# Patient Record
Sex: MALE | Race: WHITE | Employment: FULL TIME | URBAN - NONMETROPOLITAN AREA
[De-identification: names, ages, dates, MRNs, and addresses within clinical notes are randomized per-mention and may not be internally consistent; named-entity substitution may affect disease eponyms.]

---

## 2019-04-22 ENCOUNTER — OFFICE VISIT (OUTPATIENT)
Dept: INTERNAL MEDICINE CLINIC | Age: 39
End: 2019-04-22
Payer: COMMERCIAL

## 2019-04-22 VITALS
BODY MASS INDEX: 40.43 KG/M2 | SYSTOLIC BLOOD PRESSURE: 160 MMHG | WEIGHT: 315 LBS | HEART RATE: 72 BPM | DIASTOLIC BLOOD PRESSURE: 100 MMHG | TEMPERATURE: 98.4 F | OXYGEN SATURATION: 98 % | RESPIRATION RATE: 16 BRPM | HEIGHT: 74 IN

## 2019-04-22 DIAGNOSIS — N28.9 RENAL INSUFFICIENCY: ICD-10-CM

## 2019-04-22 DIAGNOSIS — D64.9 ANEMIA, UNSPECIFIED TYPE: ICD-10-CM

## 2019-04-22 DIAGNOSIS — R03.0 ELEVATED BP WITHOUT DIAGNOSIS OF HYPERTENSION: Primary | ICD-10-CM

## 2019-04-22 LAB
A/G RATIO: 1.7 (ref 1.1–2.2)
ALBUMIN SERPL-MCNC: 4.9 G/DL (ref 3.4–5)
ALP BLD-CCNC: 65 U/L (ref 40–129)
ALT SERPL-CCNC: 33 U/L (ref 10–40)
ANION GAP SERPL CALCULATED.3IONS-SCNC: 19 MMOL/L (ref 3–16)
AST SERPL-CCNC: 19 U/L (ref 15–37)
BASOPHILS ABSOLUTE: 0 K/UL (ref 0–0.2)
BASOPHILS RELATIVE PERCENT: 0.6 %
BILIRUB SERPL-MCNC: 0.8 MG/DL (ref 0–1)
BUN BLDV-MCNC: 13 MG/DL (ref 7–20)
CALCIUM SERPL-MCNC: 10.5 MG/DL (ref 8.3–10.6)
CHLORIDE BLD-SCNC: 101 MMOL/L (ref 99–110)
CO2: 21 MMOL/L (ref 21–32)
CREAT SERPL-MCNC: 0.9 MG/DL (ref 0.9–1.3)
EOSINOPHILS ABSOLUTE: 0.1 K/UL (ref 0–0.6)
EOSINOPHILS RELATIVE PERCENT: 1.2 %
GFR AFRICAN AMERICAN: >60
GFR NON-AFRICAN AMERICAN: >60
GLOBULIN: 2.9 G/DL
GLUCOSE BLD-MCNC: 96 MG/DL (ref 70–99)
HCT VFR BLD CALC: 48.3 % (ref 40.5–52.5)
HEMOGLOBIN: 16.6 G/DL (ref 13.5–17.5)
LYMPHOCYTES ABSOLUTE: 2.9 K/UL (ref 1–5.1)
LYMPHOCYTES RELATIVE PERCENT: 36.4 %
MCH RBC QN AUTO: 30.2 PG (ref 26–34)
MCHC RBC AUTO-ENTMCNC: 34.2 G/DL (ref 31–36)
MCV RBC AUTO: 88.3 FL (ref 80–100)
MONOCYTES ABSOLUTE: 0.6 K/UL (ref 0–1.3)
MONOCYTES RELATIVE PERCENT: 7.6 %
NEUTROPHILS ABSOLUTE: 4.3 K/UL (ref 1.7–7.7)
NEUTROPHILS RELATIVE PERCENT: 54.2 %
PDW BLD-RTO: 13.9 % (ref 12.4–15.4)
PLATELET # BLD: 320 K/UL (ref 135–450)
PMV BLD AUTO: 8.9 FL (ref 5–10.5)
POTASSIUM SERPL-SCNC: 4.8 MMOL/L (ref 3.5–5.1)
RBC # BLD: 5.48 M/UL (ref 4.2–5.9)
SODIUM BLD-SCNC: 141 MMOL/L (ref 136–145)
TOTAL PROTEIN: 7.8 G/DL (ref 6.4–8.2)
WBC # BLD: 7.9 K/UL (ref 4–11)

## 2019-04-22 PROCEDURE — 99203 OFFICE O/P NEW LOW 30 MIN: CPT | Performed by: INTERNAL MEDICINE

## 2019-04-22 ASSESSMENT — ENCOUNTER SYMPTOMS
RESPIRATORY NEGATIVE: 1
CHEST TIGHTNESS: 0
EYES NEGATIVE: 1
COUGH: 0
SHORTNESS OF BREATH: 0
GASTROINTESTINAL NEGATIVE: 1
WHEEZING: 0
ALLERGIC/IMMUNOLOGIC NEGATIVE: 1

## 2019-04-22 ASSESSMENT — PATIENT HEALTH QUESTIONNAIRE - PHQ9
2. FEELING DOWN, DEPRESSED OR HOPELESS: 0
SUM OF ALL RESPONSES TO PHQ QUESTIONS 1-9: 0
1. LITTLE INTEREST OR PLEASURE IN DOING THINGS: 0
SUM OF ALL RESPONSES TO PHQ9 QUESTIONS 1 & 2: 0
SUM OF ALL RESPONSES TO PHQ QUESTIONS 1-9: 0

## 2019-04-22 NOTE — PROGRESS NOTES
ororganomegaly. EXTREMITIES: - No cyanosis, clubbing, or significant edema. SKIN: Skin is warm and dry. NEUROLOGICAL: - Cranial nerves II through XII are grossly intact. IMPRESSION:    Encounter Diagnoses   Name Primary?  Elevated BP without diagnosis of hypertension Yes    Renal insufficiency     Anemia, unspecified type        ASSESSMENT/PLAN:    1. Elevated BP : follow low salt diet : recheck BP if still high will start on medications. 2. H/o renal insuff : will check BMP  3. Pt had anemia : in  : will check CBC again  4. RTO in one month  Orders Placed This Encounter   Procedures    Comprehensive Metabolic Panel    CBC Auto Differential         Mediations reviewed with the patient. Continue current medications. Appropriate prescriptions are addressed. After visit summeryprovided. Follow up as directed sooner if needed. Questions answered and patient verbalizes understanding. Call for any problems, questions, or concerns. Allergies   Allergen Reactions    Sulfa Antibiotics     Zithromax [Azithromycin] Hives     Current Outpatient Medications   Medication Sig Dispense Refill    diphenhydrAMINE-APAP, sleep, (TYLENOL PM EXTRA STRENGTH PO) Take by mouth       No current facility-administered medications for this visit.       Past Medical History:   Diagnosis Date    Acute kidney injury ([facility] [address] 2014    Appendicitis with perforation     patient had intra abdominal abscess    History of appendectomy     History of lung surgery      Past Surgical History:   Procedure Laterality Date    LAPAROSCOPIC APPENDECTOMY  13    TONSILLECTOMY       Social History     Tobacco Use    Smoking status: Former Smoker     Packs/day: 1.00     Years: 13.00     Pack years: 13.00     Types: Cigarettes     Last attempt to quit: 2014     Years since quittin.2    Smokeless tobacco: Never Used   Substance Use Topics    Alcohol use: Yes     Comment: social       LAB REVIEW:  CBC:   Lab Results   Component Value Date    WBC 11.6 01/19/2014    HGB 8.9 01/19/2014    HCT 27.6 01/19/2014     01/19/2014     Lipids: No results found for: CHOL, TRIG, HDL, LDLCALC, LDLDIRECT, TRIGLYCFAST  Renal:   Lab Results   Component Value Date    BUN 10 01/13/2014    CREATININE 0.6 01/13/2014     01/13/2014    K 4.0 01/13/2014    ALT 27 11/14/2013    AST 19 11/14/2013    GLUCOSE 117 01/13/2014     PT/INR:   Lab Results   Component Value Date    INR 1.52 01/13/2014     A1C: No results found for: Ishan Shultz MD, 4/22/2019 , 3:26 PM

## 2019-05-20 ENCOUNTER — OFFICE VISIT (OUTPATIENT)
Dept: INTERNAL MEDICINE CLINIC | Age: 39
End: 2019-05-20
Payer: COMMERCIAL

## 2019-05-20 VITALS
WEIGHT: 304 LBS | BODY MASS INDEX: 39.01 KG/M2 | OXYGEN SATURATION: 97 % | SYSTOLIC BLOOD PRESSURE: 123 MMHG | HEIGHT: 74 IN | DIASTOLIC BLOOD PRESSURE: 80 MMHG | HEART RATE: 62 BPM

## 2019-05-20 DIAGNOSIS — R03.0 ELEVATED BP WITHOUT DIAGNOSIS OF HYPERTENSION: Primary | ICD-10-CM

## 2019-05-20 DIAGNOSIS — N28.9 RENAL INSUFFICIENCY: ICD-10-CM

## 2019-05-20 PROCEDURE — 99213 OFFICE O/P EST LOW 20 MIN: CPT | Performed by: INTERNAL MEDICINE

## 2019-05-20 NOTE — PROGRESS NOTES
Nohelia Duke  Patient's  is 1980  Seen in office on 2019      SUBJECTIVE:  Pebbles Kaur rey 42 y. o.year old male presents today   Chief Complaint   Patient presents with    1 Month Follow-Up     htn, lab results     Pt is feeling well. His blood pressure is normal.  No headaches. No dizziness. No chest pain or shortness of breath. Losing weight  He was drinking lowco alcoholic drinks which had 660 elena in 24 cans < which he stopped it  His weight decreased since he stopped drinking that. Now drinking few shots  Advised patient to stop alcoholic drink  Pt is doing exercises and jogging also. All the lab results were discussed with patient in detail. Taking medications regularly. No side effects noted. Review of Systems   Constitutional: Positive for unexpected weight change. HENT: Negative. Eyes: Negative. Respiratory: Negative. Cardiovascular: Negative. Gastrointestinal: Negative. Endocrine: Negative. Genitourinary: Negative. Musculoskeletal: Negative. Skin: Negative. Allergic/Immunologic: Negative. Neurological: Negative. Hematological: Negative. Psychiatric/Behavioral: Negative. OBJECTIVE: /80 (Site: Right Upper Arm, Position: Sitting, Cuff Size: Medium Adult)   Pulse 62   Ht 6' 2\" (1.88 m)   Wt (!) 304 lb (137.9 kg)   SpO2 97%   BMI 39.03 kg/m²     Wt Readings from Last 3 Encounters:   19 (!) 304 lb (137.9 kg)   19 (!) 331 lb 9.6 oz (150.4 kg)   18 (!) 305 lb (138.3 kg)      GENERAL: - Alert, oriented, pleasant, in no apparent distress. HEENT: - Conjunctiva pink, no scleral icterus. ENT clear. NECK: -Supple. No jugular venous distention noted. No masses felt,  CARDIOVASCULAR: - Normal S1 and S2    PULMONARY: - No respiratory distress. No wheezes or rales. ABDOMEN: - Soft and non-tender,no masses  ororganomegaly. EXTREMITIES: - No cyanosis, clubbing, or significant edema. SKIN: Skin is warm and dry. NEUROLOGICAL: - Cranial nerves II through XII are grossly intact. IMPRESSION:    Encounter Diagnoses   Name Primary?  Elevated BP without diagnosis of hypertension Yes    Renal insufficiency        ASSESSMENT/PLAN:    BP is normal now  Renal function normal  Voluntary weight loss  Continue the same  Recheck BP in 6 months. Mediations reviewed with the patient. Continue current medications. Appropriate prescriptions are addressed. After visit summeryprovided. Follow up as directed sooner if needed. Questions answered and patient verbalizes understanding. Call for any problems, questions, or concerns. Allergies   Allergen Reactions    Sulfa Antibiotics     Zithromax [Azithromycin] Hives     Current Outpatient Medications   Medication Sig Dispense Refill    diphenhydrAMINE-APAP, sleep, (TYLENOL PM EXTRA STRENGTH PO) Take by mouth       No current facility-administered medications for this visit.       Past Medical History:   Diagnosis Date    Acute kidney injury ([facility] [address] 2014    Appendicitis with perforation     patient had intra abdominal abscess    History of appendectomy     History of lung surgery      Past Surgical History:   Procedure Laterality Date    LAPAROSCOPIC APPENDECTOMY  13    TONSILLECTOMY       Social History     Tobacco Use    Smoking status: Former Smoker     Packs/day: 1.00     Years: 13.00     Pack years: 13.00     Types: Cigarettes     Last attempt to quit: 2014     Years since quittin.3    Smokeless tobacco: Never Used   Substance Use Topics    Alcohol use: Yes     Comment: social       LAB REVIEW:  CBC:   Lab Results   Component Value Date    WBC 7.9 2019    HGB 16.6 2019    HCT 48.3 2019     2019     Lipids: No results found for: CHOL, TRIG, HDL, LDLCALC, LDLDIRECT, TRIGLYCFAST  Renal:   Lab Results   Component Value Date    BUN 13 2019    CREATININE 0.9 2019     2019    K 4.8 2019

## 2019-05-27 ASSESSMENT — ENCOUNTER SYMPTOMS
EYES NEGATIVE: 1
GASTROINTESTINAL NEGATIVE: 1
ALLERGIC/IMMUNOLOGIC NEGATIVE: 1
RESPIRATORY NEGATIVE: 1

## 2019-09-01 ENCOUNTER — HOSPITAL ENCOUNTER (EMERGENCY)
Age: 39
Discharge: HOME | End: 2019-09-01
Attending: EMERGENCY MEDICINE
Payer: COMMERCIAL

## 2019-09-01 VITALS
BODY MASS INDEX: 32.08 KG/M2 | OXYGEN SATURATION: 98 % | HEART RATE: 72 BPM | SYSTOLIC BLOOD PRESSURE: 136 MMHG | WEIGHT: 250 LBS | HEIGHT: 74 IN | DIASTOLIC BLOOD PRESSURE: 91 MMHG | RESPIRATION RATE: 16 BRPM | TEMPERATURE: 98.9 F

## 2019-09-01 DIAGNOSIS — K04.7 DENTAL ABSCESS: Primary | ICD-10-CM

## 2019-09-01 PROCEDURE — 99282 EMERGENCY DEPT VISIT SF MDM: CPT

## 2019-09-01 RX ORDER — HYDROCODONE BITARTRATE AND ACETAMINOPHEN 5; 325 MG/1; MG/1
1 TABLET ORAL EVERY 6 HOURS PRN
Qty: 10 TABLET | Refills: 0 | Status: SHIPPED | OUTPATIENT
Start: 2019-09-01 | End: 2019-09-04

## 2019-09-01 RX ORDER — METHYLPREDNISOLONE 4 MG/1
TABLET ORAL
Qty: 1 KIT | Refills: 0 | Status: SHIPPED | OUTPATIENT
Start: 2019-09-01 | End: 2019-11-18 | Stop reason: ALTCHOICE

## 2019-09-01 RX ORDER — AMOXICILLIN 500 MG/1
500 CAPSULE ORAL 3 TIMES DAILY
Qty: 30 CAPSULE | Refills: 0 | Status: SHIPPED | OUTPATIENT
Start: 2019-09-01 | End: 2019-09-11

## 2019-09-01 ASSESSMENT — PAIN SCALES - GENERAL: PAINLEVEL_OUTOF10: 5

## 2019-09-01 ASSESSMENT — PAIN DESCRIPTION - PAIN TYPE: TYPE: ACUTE PAIN

## 2019-09-01 ASSESSMENT — PAIN DESCRIPTION - LOCATION: LOCATION: TEETH

## 2019-09-01 ASSESSMENT — PAIN DESCRIPTION - ORIENTATION: ORIENTATION: RIGHT;UPPER

## 2019-09-01 ASSESSMENT — PAIN DESCRIPTION - DESCRIPTORS: DESCRIPTORS: THROBBING

## 2019-09-01 NOTE — ED PROVIDER NOTES
GENERAL APPEARANCE: Awake and alert. Cooperative. No acute distress. Nontoxic in appearance  HEAD: Normocephalic. Atraumatic. EYES: Sclera anicteric. ENT: Tolerates saliva. Dentition generally in good repair. The patient has a tenderness palpation in the buccal gingiva of the #7 tooth in his right upper jaw. It is slightly loose. There is minimal caries seen on his oral exam.  NECK: Supple. Trachea midline. LUNGS: Respirations unlabored. EXTREMITIES: No acute deformities. SKIN: Warm and dry. NEUROLOGICAL: No gross facial drooping. PSYCHIATRIC: Normal mood. Labs:  No results found for this visit on 09/01/19. Radiographs (if obtained):  [] The following radiograph was interpreted by myself in the absence of a radiologist:  [x] Radiologist's Report reviewed at time of ED visit:  No orders to display       ED Course and MDM:  Patient will be treated with antibiotics and a Medrol Dosepak. He is given a prescription for 10 Norco and is instructed to follow-up with his dentist as soon as possible. He is discharged in stable condition at this time. Final Impression:  1. Dental abscess      DISPOSITION Decision To Discharge    Patient referred to: Your dentist    Schedule an appointment as soon as possible for a visit   As soon as possible    Discharge medications:  New Prescriptions    AMOXICILLIN (AMOXIL) 500 MG CAPSULE    Take 1 capsule by mouth 3 times daily for 10 days    HYDROCODONE-ACETAMINOPHEN (NORCO) 5-325 MG PER TABLET    Take 1 tablet by mouth every 6 hours as needed for Pain for up to 3 days. METHYLPREDNISOLONE (MEDROL, KAYLEEN,) 4 MG TABLET    Take by mouth.      (Please note that portions of this note may have been completed with a voice recognition program. Efforts were made to edit the dictations but occasionally words are mis-transcribed.)    Roel Ahn DO, [address] [facility]  Board certified in [address] [place] [address]  09/01/19 [id number]

## 2019-11-18 ENCOUNTER — OFFICE VISIT (OUTPATIENT)
Dept: INTERNAL MEDICINE CLINIC | Age: 39
End: 2019-11-18
Payer: COMMERCIAL

## 2019-11-18 VITALS
WEIGHT: 248 LBS | HEART RATE: 65 BPM | SYSTOLIC BLOOD PRESSURE: 130 MMHG | DIASTOLIC BLOOD PRESSURE: 84 MMHG | BODY MASS INDEX: 31.84 KG/M2 | OXYGEN SATURATION: 96 %

## 2019-11-18 DIAGNOSIS — J20.8 ACUTE BRONCHITIS DUE TO OTHER SPECIFIED ORGANISMS: ICD-10-CM

## 2019-11-18 DIAGNOSIS — Z13.1 DIABETES MELLITUS SCREENING: Primary | ICD-10-CM

## 2019-11-18 DIAGNOSIS — R63.4 WEIGHT LOSS: ICD-10-CM

## 2019-11-18 LAB — HBA1C MFR BLD: 5.1 %

## 2019-11-18 PROCEDURE — 99213 OFFICE O/P EST LOW 20 MIN: CPT | Performed by: INTERNAL MEDICINE

## 2019-11-18 PROCEDURE — 83036 HEMOGLOBIN GLYCOSYLATED A1C: CPT | Performed by: INTERNAL MEDICINE

## 2019-11-18 RX ORDER — CEFUROXIME AXETIL 250 MG/1
250 TABLET ORAL 2 TIMES DAILY
Qty: 14 TABLET | Refills: 0 | Status: SHIPPED | OUTPATIENT
Start: 2019-11-18 | End: 2019-11-25

## 2019-11-18 RX ORDER — GUAIFENESIN 400 MG/1
400 TABLET ORAL 4 TIMES DAILY PRN
COMMUNITY
End: 2021-07-07

## 2020-02-17 ENCOUNTER — OFFICE VISIT (OUTPATIENT)
Dept: INTERNAL MEDICINE CLINIC | Age: 40
End: 2020-02-17
Payer: COMMERCIAL

## 2020-02-17 VITALS
WEIGHT: 250 LBS | HEART RATE: 73 BPM | TEMPERATURE: 97.6 F | DIASTOLIC BLOOD PRESSURE: 76 MMHG | OXYGEN SATURATION: 98 % | BODY MASS INDEX: 32.08 KG/M2 | HEIGHT: 74 IN | RESPIRATION RATE: 16 BRPM | SYSTOLIC BLOOD PRESSURE: 128 MMHG

## 2020-02-17 PROCEDURE — 99213 OFFICE O/P EST LOW 20 MIN: CPT | Performed by: INTERNAL MEDICINE

## 2020-02-17 RX ORDER — AMOXICILLIN 500 MG/1
500 CAPSULE ORAL 3 TIMES DAILY
Qty: 21 CAPSULE | Refills: 0 | Status: SHIPPED | OUTPATIENT
Start: 2020-02-17 | End: 2021-06-16

## 2020-02-17 NOTE — PROGRESS NOTES
Mirtha Cutler  Patient's  is 1980  Seen in office on 2020      SUBJECTIVE:  Candis Koyanagi isa 42 y. o.year old male presents today   Chief Complaint   Patient presents with    3 Month Follow-Up    Pharyngitis     Pt is here for wt check. Patient has lost weight quite a bit on some kind of diet. He has lost wt voluntary  Now feels well. He is not losing anymore weight. Denies any abdominal pain. Appetite is fair. C/o sore throat for the last few days. Has sinus pressure. Nasal congestion. No fever or chills  No nausea vomiting diarrhea. No body ache    Complains of ED for the last 4 to 5 years. Patient states he has libido but erections are not full. He was not investigated in the past.  He is not diabetic. His hemoglobin A1c was normal    Review of Systems    OBJECTIVE: /76   Pulse 73   Temp 97.6 °F (36.4 °C)   Resp 16   Ht 6' 2\" (1.88 m)   Wt 250 lb (113.4 kg)   SpO2 98%   BMI 32.10 kg/m²     Wt Readings from Last 3 Encounters:   20 250 lb (113.4 kg)   19 248 lb (112.5 kg)   19 250 lb (113.4 kg)      GENERAL: - Alert, oriented, pleasant, in no apparent distress. HEENT: - Conjunctiva pink, no scleral icterus. ENT clear. Has sinus tenderness. Inferior turbinates swollen. Throat is slightly red but no exudate noted. NECK: -Supple. No jugular venous distention noted. No masses felt,  CARDIOVASCULAR: - Normal S1 and S2    PULMONARY: - No respiratory distress. No wheezes or rales. ABDOMEN: - Soft and non-tender,no masses  ororganomegaly. EXTREMITIES: - No cyanosis, clubbing, or significant edema. SKIN: Skin is warm and dry. NEUROLOGICAL: - Cranial nerves II through XII are grossly intact. IMPRESSION:    Encounter Diagnoses   Name Primary?  Erectile dysfunction, unspecified erectile dysfunction type Yes    Acute pharyngitis, unspecified etiology     Weight loss        ASSESSMENT/PLAN:    Pharyngitis /sinusitis : amoxil 500 mg tid  Wt stable. No more weight loss  ED. Will check testosterone level and refer patient to urologist Dr. Lorelle Goodpasture. Call back for the results of testosterone. If any symptoms patient should return to office sooner  RTO in one year. Mediations reviewed with the patient. Continue current medications. Appropriate prescriptions are addressed. After visit summeryprovided. Follow up as directed sooner if needed. Questions answered and patient verbalizes understanding. Call for any problems, questions, or concerns. Allergies   Allergen Reactions    Sulfa Antibiotics Hives    Zithromax [Azithromycin] Hives     Current Outpatient Medications   Medication Sig Dispense Refill    guaiFENesin 400 MG tablet Take 400 mg by mouth 4 times daily as needed for Cough       No current facility-administered medications for this visit. Past Medical History:   Diagnosis Date    Abscess, intra-abdominal, postoperative 2014    Acute kidney injury ([facility] [address] 2014    Appendicitis with perforation     patient had intra abdominal abscess    Appendicitis, acute 2013    History of appendectomy     History of lung surgery     Weight loss 2019    Patient states he lost weight intentionally. He went on crash diet and lost about 80 pounds. He is maintaining around 150.   Does not want any test.     Past Surgical History:   Procedure Laterality Date    APPENDECTOMY      LAPAROSCOPIC APPENDECTOMY  13    TONSILLECTOMY       Social History     Tobacco Use    Smoking status: Former Smoker     Packs/day: 1.00     Years: 13.00     Pack years: 13.00     Types: Cigarettes     Last attempt to quit: 2014     Years since quittin.0    Smokeless tobacco: Never Used   Substance Use Topics    Alcohol use: Yes     Comment: social       LAB REVIEW:  CBC:   Lab Results   Component Value Date    WBC 7.9 2019    HGB 16.6 2019    HCT 48.3 2019     2019     Lipids: No results found for: CHOL, TRIG, HDL, LDLCALC, LDLDIRECT, TRIGLYCFAST  Renal:   Lab Results   Component Value Date    BUN 13 04/22/2019    CREATININE 0.9 04/22/2019     04/22/2019    K 4.8 04/22/2019    ALT 33 04/22/2019    AST 19 04/22/2019    GLUCOSE 96 04/22/2019     PT/INR:   Lab Results   Component Value Date    INR 1.52 01/13/2014     A1C:   Lab Results   Component Value Date    LABA1C 5.1 11/18/2019           Samra Barton MD, 2/17/2020 , 5:02 PM

## 2021-02-17 ENCOUNTER — TELEPHONE (OUTPATIENT)
Dept: INTERNAL MEDICINE CLINIC | Age: 41
End: 2021-02-17

## 2021-06-16 ENCOUNTER — OFFICE VISIT (OUTPATIENT)
Dept: INTERNAL MEDICINE CLINIC | Age: 41
End: 2021-06-16
Payer: COMMERCIAL

## 2021-06-16 VITALS
WEIGHT: 250 LBS | BODY MASS INDEX: 32.08 KG/M2 | SYSTOLIC BLOOD PRESSURE: 162 MMHG | HEIGHT: 74 IN | HEART RATE: 85 BPM | OXYGEN SATURATION: 98 % | DIASTOLIC BLOOD PRESSURE: 100 MMHG

## 2021-06-16 DIAGNOSIS — N50.811 PAIN IN BOTH TESTICLES: ICD-10-CM

## 2021-06-16 DIAGNOSIS — I10 ESSENTIAL HYPERTENSION: Primary | ICD-10-CM

## 2021-06-16 DIAGNOSIS — N50.812 PAIN IN BOTH TESTICLES: ICD-10-CM

## 2021-06-16 DIAGNOSIS — F51.01 PRIMARY INSOMNIA: ICD-10-CM

## 2021-06-16 LAB
ANION GAP SERPL CALCULATED.3IONS-SCNC: 15 MMOL/L (ref 3–16)
BASOPHILS ABSOLUTE: 0 K/UL (ref 0–0.2)
BASOPHILS RELATIVE PERCENT: 0.4 %
BILIRUBIN, POC: ABNORMAL
BLOOD URINE, POC: ABNORMAL
BUN BLDV-MCNC: 13 MG/DL (ref 7–20)
CALCIUM SERPL-MCNC: 10 MG/DL (ref 8.3–10.6)
CHLORIDE BLD-SCNC: 101 MMOL/L (ref 99–110)
CHOLESTEROL, TOTAL: 185 MG/DL (ref 0–199)
CLARITY, POC: CLEAR
CO2: 25 MMOL/L (ref 21–32)
COLOR, POC: YELLOW
CREAT SERPL-MCNC: 1 MG/DL (ref 0.9–1.3)
EOSINOPHILS ABSOLUTE: 0 K/UL (ref 0–0.6)
EOSINOPHILS RELATIVE PERCENT: 0.3 %
GFR AFRICAN AMERICAN: >60
GFR NON-AFRICAN AMERICAN: >60
GLUCOSE BLD-MCNC: 93 MG/DL (ref 70–99)
GLUCOSE URINE, POC: ABNORMAL
HCT VFR BLD CALC: 46.7 % (ref 40.5–52.5)
HDLC SERPL-MCNC: 81 MG/DL (ref 40–60)
HEMOGLOBIN: 16.3 G/DL (ref 13.5–17.5)
KETONES, POC: ABNORMAL
LDL CHOLESTEROL CALCULATED: 90 MG/DL
LEUKOCYTE EST, POC: ABNORMAL
LYMPHOCYTES ABSOLUTE: 1.4 K/UL (ref 1–5.1)
LYMPHOCYTES RELATIVE PERCENT: 14.5 %
MCH RBC QN AUTO: 30.5 PG (ref 26–34)
MCHC RBC AUTO-ENTMCNC: 34.9 G/DL (ref 31–36)
MCV RBC AUTO: 87.3 FL (ref 80–100)
MONOCYTES ABSOLUTE: 0.5 K/UL (ref 0–1.3)
MONOCYTES RELATIVE PERCENT: 5.1 %
NEUTROPHILS ABSOLUTE: 7.6 K/UL (ref 1.7–7.7)
NEUTROPHILS RELATIVE PERCENT: 79.7 %
NITRITE, POC: ABNORMAL
PDW BLD-RTO: 14.2 % (ref 12.4–15.4)
PH, POC: 7
PLATELET # BLD: 322 K/UL (ref 135–450)
PMV BLD AUTO: 8.9 FL (ref 5–10.5)
POTASSIUM SERPL-SCNC: 4.1 MMOL/L (ref 3.5–5.1)
PROTEIN, POC: ABNORMAL
RBC # BLD: 5.35 M/UL (ref 4.2–5.9)
SODIUM BLD-SCNC: 141 MMOL/L (ref 136–145)
SPECIFIC GRAVITY, POC: 1.02
TRIGL SERPL-MCNC: 69 MG/DL (ref 0–150)
UROBILINOGEN, POC: 1
VLDLC SERPL CALC-MCNC: 14 MG/DL
WBC # BLD: 9.6 K/UL (ref 4–11)

## 2021-06-16 PROCEDURE — 81002 URINALYSIS NONAUTO W/O SCOPE: CPT | Performed by: NURSE PRACTITIONER

## 2021-06-16 PROCEDURE — 99204 OFFICE O/P NEW MOD 45 MIN: CPT | Performed by: NURSE PRACTITIONER

## 2021-06-16 RX ORDER — TRAZODONE HYDROCHLORIDE 50 MG/1
50 TABLET ORAL NIGHTLY
Qty: 30 TABLET | Refills: 0 | Status: SHIPPED | OUTPATIENT
Start: 2021-06-16 | End: 2021-11-15 | Stop reason: ALTCHOICE

## 2021-06-16 ASSESSMENT — PATIENT HEALTH QUESTIONNAIRE - PHQ9
SUM OF ALL RESPONSES TO PHQ QUESTIONS 1-9: 2
SUM OF ALL RESPONSES TO PHQ QUESTIONS 1-9: 2
2. FEELING DOWN, DEPRESSED OR HOPELESS: 1
1. LITTLE INTEREST OR PLEASURE IN DOING THINGS: 1
SUM OF ALL RESPONSES TO PHQ9 QUESTIONS 1 & 2: 2
SUM OF ALL RESPONSES TO PHQ QUESTIONS 1-9: 2

## 2021-06-16 NOTE — LETTER
[facility]  [address] [place] [id number]  Phone: [phone]  Fax: [phone]    CHELY Welch CNP        June 16, 2021     Patient: Daniele Bowens   YOB: 1980   Date of Visit: 6/16/2021       To Whom it May Concern:    Jackelyn Barrett was seen in my clinic for an urgent matter on 6/16/2021. He may return to work on 06/20/21. If you have any questions or concerns, please don't hesitate to call.     Sincerely,           CHELY Welch CNP

## 2021-06-16 NOTE — PROGRESS NOTES
Subjective: Leandro Christensen is a 46 y.o. male who presents for evaluation of elevated blood pressures. Age at onset of elevated blood pressure:  Last few months. Cardiac symptoms: none. Patient denies: chest pain, chest pressure/discomfort, dyspnea, exertional chest pressure/discomfort and fatigue. Cardiovascular risk factors: family history of premature cardiovascular disease and male gender. Use of agents associated with hypertension: steroids and caffine pills, creatiene supplements. History of target organ damage: none. History of taking caffiene pills to stay awake at work due to insomnia. Has went from taking 4-5 pills a day to 2. Pt also takes creatine  daily for weight traing  Protein supplement post workout. Pt workout routine everyother day with weight training. Pt also reports that his testes started feeling burn, and not right about a week ago. Past Medical History:   Diagnosis Date    Abscess, intra-abdominal, postoperative 1/13/2014    Acute kidney injury ([facility] [address] 01/2014    Appendicitis with perforation     patient had intra abdominal abscess    Appendicitis, acute 11/14/2013    History of appendectomy     History of lung surgery     Weight loss 11/18/2019    Patient states he lost weight intentionally. He went on crash diet and lost about 80 pounds. He is maintaining around 150.   Does not want any test.     Patient Active Problem List    Diagnosis Date Noted    Essential hypertension 06/16/2021    Primary insomnia 06/16/2021    Weight loss 11/18/2019    Acute bronchitis due to other specified organisms 11/18/2019     Past Surgical History:   Procedure Laterality Date    APPENDECTOMY      LAPAROSCOPIC APPENDECTOMY  11/14/13    TONSILLECTOMY  1990     Current Outpatient Medications   Medication Sig Dispense Refill    traZODone (DESYREL) 50 MG tablet Take 1 tablet by mouth nightly 30 tablet 0    guaiFENesin 400 MG tablet Take 400 mg by mouth 4 times daily as needed for Cough       No current facility-administered medications for this visit. Review of Systems  Pertinent items are noted in HPI. Objective:      BP (!) 162/100   Pulse 85   Ht 6' 2\" (1.88 m)   Wt 250 lb (113.4 kg)   SpO2 98%   BMI 32.10 kg/m²   General appearance: alert, appears stated age and cooperative  Head: Normocephalic, without obvious abnormality, atraumatic  Lungs: clear to auscultation bilaterally  Heart: regular rate and rhythm, S1, S2 normal, no murmur, click, rub or gallop    Cardiographics  ECG: not done at this time      Assessment:      Hypertension, prehypertension secondary to supplements. Evidence of target organ damage: none. Plan:      Screening labs for initial evaluation: basic metabolic panel, creatinine, lipid panel and urinalysis. Screening for causes of secondary hypertension: none indicated. Dietary sodium restriction. Follow up: 3 weeks and as needed. 1. Essential hypertension  New onset HTN. Pt went for physical for new employment and was told that he was HTN for that visit. Pt is here today for more evaluation. Pt has no history of HTN, nor did I see previous results of follow labs when reviewing this chart. Pt is going stop all supplements, increase water intake and come back in three weeks for BP recheck before medication therapy is discussed. - LIPID PANEL  - BASIC METABOLIC PANEL  - CBC Auto Differential    2. Primary insomnia  New onset. Pt is having a hard time sleeping. He has a few cocktails a night to help him fall asleep; but he is only able to sleep a few hours tonight. 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 Advised pt to not drink and take this medication. traZODone (DESYREL) 50 MG tablet                Take 1 tablet by mouth nightly, Disp-30 tablet, R-0         3. Pain in both testicles  Pt states that for the past week he states that there has been a slight pain in both his testicles. No discoloration, no edema, no masses.

## 2021-06-16 NOTE — PATIENT INSTRUCTIONS
Patient Education        Creatinine and Creatinine Clearance Tests: About These Tests  What are they? Creatinine tests measure the level of the waste product creatinine (say \"rech-CX-lt-neen\") in your blood and urine. These tests show how well your kidneys are working. When the kidneys are not working well, they can't filter creatinine from the blood. So the level of creatinine in the blood goes up. The creatinine clearance (a test that measures how well your kidneys remove creatinine) goes down. Why are these tests done? A blood creatinine level or a creatinine clearance test is done to:  · See if your kidneys are working normally or if a medicine is affecting your kidneys. · See if your kidney disease is staying the same or getting better or worse. How do you prepare for these tests? You may be asked to:  · Not do any strenuous exercise for 2 days (48 hours) before having the tests. · Not eat more than 8 ounces of meat, especially beef, or other protein for 24 hours before the blood creatinine test and during the creatinine clearance urine test.  · Drink plenty of fluids if you are asked to collect your urine for 24 hours. But don't drink coffee or tea. These are diuretics that cause your body to pass more urine than normal.  If you are asked to collect urine, your doctor will give you a large container that holds about 1 gallon. You will use the container to collect your urine for 24 hours. Tell your doctor ALL the medicines, vitamins, supplements, and herbal remedies you take. Some may increase the risk of problems during your test. Your doctor will tell you if you should stop taking any of them before the test and how soon to do it. .  How are the tests done? A health professional uses a needle to take a blood sample, usually from the arm. How to do the test  You collect your urine for a period of time, such as over 4 or 24 hours.  Your doctor will give you a large container that holds about 1 gallon. You will use the container to collect your urine. · When you first get up, you empty your bladder. But don't save this urine. Write down the time you began. · For the set period of time, collect all your urine. Each time you urinate during this time period, collect your urine in a small, clean container. Then pour the urine into the large container. Don't touch the inside of either container with your fingers. · Don't get toilet paper, pubic hair, stool (feces), menstrual blood, or anything else in the urine sample. · Keep the collected urine in the refrigerator for the collection time. · Empty your bladder for the last time at or just before the end of the collection period. Add this urine to the large container. Then write down the time. How long do the tests take? The urine test will take 24 hours. The blood test will take a few minutes. What happens after the tests? · You will probably be able to go home right away. · You can go back to your usual activities right away. Follow-up care is a key part of your treatment and safety. Be sure to make and go to all appointments, and call your doctor if you are having problems. It's also a good idea to keep a list of the medicines you take. Ask your doctor when you can expect to have your test results. Where can you learn more? Go to [email or web address] org and sign in to your [email or web address] account. Enter G802 in the [facility] box to learn more about \"Creatinine and Creatinine Clearance Tests: About These Tests. \"     If you do not have an account, please click on the \"Sign Up Now\" link. Current as of: December 17, 2020               Content Version: 12.9  © 0302-9355 [employer]. Care instructions adapted under license by [facility] ([facility]).  If you have questions about a medical condition or this instruction, always ask your healthcare professional. Jaswinder Gordillo disclaims any warranty or liability for your use of this information.

## 2021-06-17 LAB
C. TRACHOMATIS DNA ,URINE: NEGATIVE
N. GONORRHOEAE DNA, URINE: NEGATIVE
URINE CULTURE, ROUTINE: NORMAL

## 2021-07-07 ENCOUNTER — OFFICE VISIT (OUTPATIENT)
Dept: INTERNAL MEDICINE CLINIC | Age: 41
End: 2021-07-07
Payer: COMMERCIAL

## 2021-07-07 VITALS
DIASTOLIC BLOOD PRESSURE: 78 MMHG | RESPIRATION RATE: 16 BRPM | HEART RATE: 96 BPM | TEMPERATURE: 98.6 F | BODY MASS INDEX: 32.56 KG/M2 | SYSTOLIC BLOOD PRESSURE: 130 MMHG | WEIGHT: 253.6 LBS | OXYGEN SATURATION: 98 %

## 2021-07-07 DIAGNOSIS — Z01.30 BLOOD PRESSURE CHECK: ICD-10-CM

## 2021-07-07 DIAGNOSIS — R63.4 WEIGHT LOSS: ICD-10-CM

## 2021-07-07 DIAGNOSIS — F51.01 PRIMARY INSOMNIA: Primary | ICD-10-CM

## 2021-07-07 PROCEDURE — 99213 OFFICE O/P EST LOW 20 MIN: CPT | Performed by: INTERNAL MEDICINE

## 2021-07-07 ASSESSMENT — ENCOUNTER SYMPTOMS
WHEEZING: 0
SHORTNESS OF BREATH: 0
ALLERGIC/IMMUNOLOGIC NEGATIVE: 1
RESPIRATORY NEGATIVE: 1
EYES NEGATIVE: 1
GASTROINTESTINAL NEGATIVE: 1
COUGH: 0

## 2021-07-07 NOTE — PROGRESS NOTES
Isa Delgadillo  Patient's  is 1980  Seen in office on 2021      SUBJECTIVE:  Winifred Ferraro rey 46 y. o.year old male presents today   Chief Complaint   Patient presents with    Follow-up     lab review     Pt is here for f/u of elevated BP  Pt was under stress and was drinking caffeine  Feels well , now  Pt has insomnia also : took trozadone only once and did not feel well and stopped taking  Sleeping good now with out any medication   Stopped taking caffeine completely   Pain in the testicles got better. Taking medications regularly. No side effects noted. Lab results reviewed     Review of Systems   Constitutional: Negative. Negative for chills, diaphoresis, fatigue and fever. HENT: Negative. Eyes: Negative. Respiratory: Negative. Negative for cough, shortness of breath and wheezing. Cardiovascular: Negative. Negative for chest pain and leg swelling. Gastrointestinal: Negative. Endocrine: Negative. Genitourinary: Negative. Musculoskeletal: Negative. Allergic/Immunologic: Negative. Neurological: Negative. Hematological: Negative. Psychiatric/Behavioral: Negative. OBJECTIVE: /78   Pulse 96   Temp 98.6 °F (37 °C)   Resp 16   Wt 253 lb 9.6 oz (115 kg)   SpO2 98%   BMI 32.56 kg/m²     Wt Readings from Last 3 Encounters:   21 253 lb 9.6 oz (115 kg)   21 250 lb (113.4 kg)   20 250 lb (113.4 kg)        Patient was seen taking COVID-19 precautions. Face mask, gloves were used. Patient also wore facemask. GENERAL:  Alert, oriented, pleasant, in no apparent distress. HEENT:  Conjunctiva pink, no scleral icterus. ENT clear. NECK: Supple. No jugular venous distention noted. No masses felt,  CARDIOVASCULAR:  Normal S1 and S2    PULMONARY:  No respiratory distress. No wheezes or rales. ABDOMEN:  Soft and non-tender,no masses  ororganomegaly. EXTREMITIES:  No cyanosis, clubbing, or significant edema.   SKIN: Skin is warm and dry.   NEUROLOGICAL:  Cranial nerves II through XII are grossly intact. IMPRESSION:    Encounter Diagnoses   Name Primary?  Primary insomnia Yes    Blood pressure check     Weight loss        ASSESSMENT/PLAN:    Elevated BP : normal now. Follow low salt and exercise. Recheck in one year. Insomnia : not taking medication . Weight loss : stable. RTO in one year    Mediations reviewed with the patient. Continue current medications. Appropriate prescriptions are addressed. After visit summeryprovided. Follow up as directed sooner if needed. Questions answered and patient verbalizes understanding. Call for any problems, questions, or concerns. Allergies   Allergen Reactions    Sulfa Antibiotics Hives    Zithromax [Azithromycin] Hives     Current Outpatient Medications   Medication Sig Dispense Refill    traZODone (DESYREL) 50 MG tablet Take 1 tablet by mouth nightly 30 tablet 0     No current facility-administered medications for this visit. Past Medical History:   Diagnosis Date    Abscess, intra-abdominal, postoperative 2014    Acute kidney injury ([facility] [address] 2014    Appendicitis with perforation     patient had intra abdominal abscess    Appendicitis, acute 2013    History of appendectomy     History of lung surgery     Weight loss 2019    Patient states he lost weight intentionally. He went on crash diet and lost about 80 pounds. He is maintaining around 150.   Does not want any test.     Past Surgical History:   Procedure Laterality Date    APPENDECTOMY      LAPAROSCOPIC APPENDECTOMY  13    TONSILLECTOMY       Social History     Tobacco Use    Smoking status: Former Smoker     Packs/day: 1.00     Years: 13.00     Pack years: 13.00     Types: Cigarettes     Quit date: 2014     Years since quittin.4    Smokeless tobacco: Never Used   Substance Use Topics    Alcohol use: Yes     Comment: social       LAB REVIEW:  CBC:   Lab Results Component Value Date    WBC 9.6 06/16/2021    HGB 16.3 06/16/2021    HCT 46.7 06/16/2021     06/16/2021     Lipids:   Lab Results   Component Value Date    HDL 81 (H) 06/16/2021    LDLCALC 90 06/16/2021     Renal:   Lab Results   Component Value Date    BUN 13 06/16/2021    CREATININE 1.0 06/16/2021     06/16/2021    K 4.1 06/16/2021    ALT 33 04/22/2019    AST 19 04/22/2019    GLUCOSE 93 06/16/2021     PT/INR:   Lab Results   Component Value Date    INR 1.52 01/13/2014     A1C:   Lab Results   Component Value Date    LABA1C 5.1 11/18/2019           Liborio Baron MD, 7/7/2021 , 1:37 PM

## 2021-08-06 PROBLEM — Z01.30 BLOOD PRESSURE CHECK: Status: RESOLVED | Noted: 2021-07-07 | Resolved: 2021-08-06

## 2021-11-15 ENCOUNTER — OFFICE VISIT (OUTPATIENT)
Dept: INTERNAL MEDICINE CLINIC | Age: 41
End: 2021-11-15
Payer: COMMERCIAL

## 2021-11-15 VITALS
BODY MASS INDEX: 34.52 KG/M2 | SYSTOLIC BLOOD PRESSURE: 120 MMHG | WEIGHT: 269 LBS | HEIGHT: 74 IN | RESPIRATION RATE: 16 BRPM | DIASTOLIC BLOOD PRESSURE: 84 MMHG | HEART RATE: 84 BPM | OXYGEN SATURATION: 98 %

## 2021-11-15 DIAGNOSIS — S83.92XA SPRAIN OF LEFT KNEE, UNSPECIFIED LIGAMENT, INITIAL ENCOUNTER: ICD-10-CM

## 2021-11-15 PROCEDURE — 99213 OFFICE O/P EST LOW 20 MIN: CPT | Performed by: INTERNAL MEDICINE

## 2021-11-15 RX ORDER — NAPROXEN 500 MG/1
500 TABLET ORAL 2 TIMES DAILY WITH MEALS
Qty: 30 TABLET | Refills: 0 | Status: SHIPPED | OUTPATIENT
Start: 2021-11-15

## 2021-11-15 NOTE — PROGRESS NOTES
Jami Malhotra  Patient's  is 1980  Seen in office on 11/15/2021      SUBJECTIVE:  Samanta Green rey 46 y. o.year old male presents today   Chief Complaint   Patient presents with    Other     left knee pain   Complains of pain in the left knee  Twisted left knee 2 weeks ago  Has pain in the left knee lateally   Flexion of the knee stiff  Limping when he walks  No falls. Taking medications regularly. No side effects noted. Patient denies any sleeping problems now. He does not have any elevated BP. He is not on any medications. Review of Systems  Review of system normal except as above. OBJECTIVE: /84 (Site: Left Upper Arm, Position: Sitting, Cuff Size: Large Adult)   Pulse 84   Resp 16   Ht 6' 2\" (1.88 m)   Wt 269 lb (122 kg)   SpO2 98%   BMI 34.54 kg/m²     Wt Readings from Last 3 Encounters:   11/15/21 269 lb (122 kg)   21 253 lb 9.6 oz (115 kg)   21 250 lb (113.4 kg)     Patient was seen taking COVID-19 precautions. Face mask, gloves were used. Patient also wore facemask. GENERAL:  Alert, oriented, pleasant, in no apparent distress. HEENT:  Conjunctiva pink, no scleral icterus. ENT clear. NECK: Supple. No jugular venous distention noted. No masses felt,  CARDIOVASCULAR:  Normal S1 and S2    PULMONARY:  No respiratory distress. No wheezes or rales. ABDOMEN:  Soft and non-tender,no masses  ororganomegaly. EXTREMITIES:  No cyanosis, clubbing, or significant edema. SKIN: Skin is warm and dry. NEUROLOGICAL:  Cranial nerves II through XII are grossly intact. There is mild swelling of the left knee. Tenderness of the left knee and lateral knee. Flexion is stiffness. No calf tenderness. Patient is limping on the left knee. IMPRESSION:    Encounter Diagnoses   Name Primary?  Sprain of left knee, unspecified ligament, initial encounter        ASSESSMENT/PLAN:    Patient has left knee pain laterally.   Take naproxen 500 mg twice a day with food  Refer patient to orthopedic surgeon  Deferred x-rays to orthopedic surgeon    Orders Placed This Encounter   Procedures   Nicolas Paez MD, [id number] [facility], [place] [address]     Return to office if the pain is worse  Follow-up with Ortho    Mediations reviewed with the patient. Continue current medications. Appropriate prescriptions are addressed. After visit summeryprovided. Follow up as directed sooner if needed. Questions answered and patient verbalizes understanding. Call for any problems, questions, or concerns. Allergies   Allergen Reactions    Sulfa Antibiotics Hives    Zithromax [Azithromycin] Hives     No current outpatient medications on file. No current facility-administered medications for this visit. Past Medical History:   Diagnosis Date    Abscess, intra-abdominal, postoperative 2014    Acute kidney injury ([facility] [address] 2014    Appendicitis with perforation     patient had intra abdominal abscess    Appendicitis, acute 2013    History of appendectomy     History of lung surgery     Weight loss 2019    Patient states he lost weight intentionally. He went on crash diet and lost about 80 pounds. He is maintaining around 150.   Does not want any test.     Past Surgical History:   Procedure Laterality Date    APPENDECTOMY      LAPAROSCOPIC APPENDECTOMY  13    TONSILLECTOMY       Social History     Tobacco Use    Smoking status: Former Smoker     Packs/day: 1.00     Years: 13.00     Pack years: 13.00     Types: Cigarettes     Quit date: 2014     Years since quittin.8    Smokeless tobacco: Never Used   Substance Use Topics    Alcohol use: Yes     Comment: social       LAB REVIEW:  CBC:   Lab Results   Component Value Date    WBC 9.6 2021    HGB 16.3 2021    HCT 46.7 2021     2021     Lipids:   Lab Results   Component Value Date    HDL 81 (H) 2021    [address] 2021     Renal:   Lab Results Component Value Date    BUN 13 06/16/2021    CREATININE 1.0 06/16/2021     06/16/2021    K 4.1 06/16/2021    ALT 33 04/22/2019    AST 19 04/22/2019    GLUCOSE 93 06/16/2021     PT/INR:   Lab Results   Component Value Date    INR 1.52 01/13/2014     A1C:   Lab Results   Component Value Date    LABA1C 5.1 11/18/2019           Gracia Haines MD, 11/15/2021 , 3:45 PM

## 2021-11-15 NOTE — PROGRESS NOTES
Patient states he twisted left knee about two weeks ago. He twisted going down icy steps and then twisted again getting out of car. He has pain lateral side of left knee. He has popping. He has decreased ROM and a lot of pain with flexion. He doesn't notice swelling.

## 2023-03-13 ENCOUNTER — OFFICE VISIT (OUTPATIENT)
Dept: INTERNAL MEDICINE CLINIC | Age: 43
End: 2023-03-13
Payer: COMMERCIAL

## 2023-03-13 VITALS
OXYGEN SATURATION: 97 % | HEIGHT: 74 IN | RESPIRATION RATE: 16 BRPM | TEMPERATURE: 97 F | SYSTOLIC BLOOD PRESSURE: 156 MMHG | HEART RATE: 80 BPM | BODY MASS INDEX: 37.4 KG/M2 | WEIGHT: 291.4 LBS | DIASTOLIC BLOOD PRESSURE: 110 MMHG

## 2023-03-13 DIAGNOSIS — S83.422A SPRAIN OF LATERAL COLLATERAL LIGAMENT OF LEFT KNEE, INITIAL ENCOUNTER: Primary | ICD-10-CM

## 2023-03-13 DIAGNOSIS — I10 ESSENTIAL HYPERTENSION: ICD-10-CM

## 2023-03-13 PROBLEM — F51.01 PRIMARY INSOMNIA: Status: RESOLVED | Noted: 2021-06-16 | Resolved: 2023-03-13

## 2023-03-13 PROBLEM — J20.8 ACUTE BRONCHITIS DUE TO OTHER SPECIFIED ORGANISMS: Status: RESOLVED | Noted: 2019-11-18 | Resolved: 2023-03-13

## 2023-03-13 PROCEDURE — 3074F SYST BP LT 130 MM HG: CPT | Performed by: INTERNAL MEDICINE

## 2023-03-13 PROCEDURE — G8417 CALC BMI ABV UP PARAM F/U: HCPCS | Performed by: INTERNAL MEDICINE

## 2023-03-13 PROCEDURE — G8484 FLU IMMUNIZE NO ADMIN: HCPCS | Performed by: INTERNAL MEDICINE

## 2023-03-13 PROCEDURE — G8427 DOCREV CUR MEDS BY ELIG CLIN: HCPCS | Performed by: INTERNAL MEDICINE

## 2023-03-13 PROCEDURE — 1036F TOBACCO NON-USER: CPT | Performed by: INTERNAL MEDICINE

## 2023-03-13 PROCEDURE — 3078F DIAST BP <80 MM HG: CPT | Performed by: INTERNAL MEDICINE

## 2023-03-13 PROCEDURE — 99213 OFFICE O/P EST LOW 20 MIN: CPT | Performed by: INTERNAL MEDICINE

## 2023-03-13 RX ORDER — NAPROXEN 500 MG/1
500 TABLET ORAL 2 TIMES DAILY WITH MEALS
Qty: 60 TABLET | Refills: 0 | Status: SHIPPED | OUTPATIENT
Start: 2023-03-13

## 2023-03-13 RX ORDER — AMLODIPINE BESYLATE 5 MG/1
5 TABLET ORAL DAILY
Qty: 30 TABLET | Refills: 0 | Status: SHIPPED | OUTPATIENT
Start: 2023-03-13

## 2023-03-13 ASSESSMENT — PATIENT HEALTH QUESTIONNAIRE - PHQ9
1. LITTLE INTEREST OR PLEASURE IN DOING THINGS: 0
SUM OF ALL RESPONSES TO PHQ9 QUESTIONS 1 & 2: 0
2. FEELING DOWN, DEPRESSED OR HOPELESS: 0
SUM OF ALL RESPONSES TO PHQ QUESTIONS 1-9: 0

## 2023-03-13 NOTE — LETTER
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 If you have any questions or concerns, please don't hesitate to call.     Sincerely,        Citlalli Oates MD

## 2023-03-13 NOTE — PROGRESS NOTES
Patient Alexia Michael  Patient's  is 1980  Seen in office on 3/13/2023      SUBJECTIVE:  [facility] rey 38 y. o.year old male presents today   Chief Complaint   Patient presents with    Knee Pain     For over a year, left knee constant dull ache, pain can get to a ten at times     C/o pain in the left knee since 2021  Slipped on icy steps in 2021 and twisted the knee . 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 No other complaints. No chest pain no shortness of breath no cough or sputum production. No abdominal pain. No nausea vomiting or diarrhea    Review of Systems  Review of system normal except as in HPI  OBJECTIVE: BP (!) 156/110 (Site: Right Upper Arm, Position: Sitting, Cuff Size: Large Adult)   Pulse 80   Temp 97 °F (36.1 °C) (Temporal)   Resp 16   Ht 6' 2\" (1.88 m)   Wt 291 lb 6.4 oz (132.2 kg)   SpO2 97%   BMI 37.41 kg/m²     Wt Readings from Last 3 Encounters:   23 291 lb 6.4 oz (132.2 kg)   11/15/21 269 lb (122 kg)   21 253 lb 9.6 oz (115 kg)      GENERAL: - Alert, oriented, pleasant, in no apparent distress. HEENT: - Conjunctiva pink, no scleral icterus. ENT clear. NECK: -Supple. No jugular venous distention noted. No masses felt,  CARDIOVASCULAR: - Normal S1 and S2    PULMONARY: - No respiratory distress. No wheezes or rales. ABDOMEN: - Soft and non-tender,no masses  ororganomegaly. EXTREMITIES: - No cyanosis, clubbing, or significant edema. SKIN: Skin is warm and dry. NEUROLOGICAL: - Cranial nerves II through XII are grossly intact. Tenderness of left knee laterally . No swelling . IMPRESSION:    Encounter Diagnoses   Name Primary?     Sprain of lateral collateral ligament of left knee, initial encounter Yes    Essential hypertension        ASSESSMENT/PLAN:    Knee brace Naprosyn and tylenol  Refer to ortho Dr. Shy Ceballos  Wants to excuse from work   Reduce work load   Patient has hypertension. We will start patient on amlodipine 5 mg daily. Patient to follow low salt diet and exercise    Mediations reviewed with the patient. Continue current medications. Appropriate prescriptions are addressed. After visit summeryprovided. Follow up as directed sooner if needed. Questions answered and patient verbalizes understanding. Call for any problems, questions, or concerns. Allergies   Allergen Reactions    Sulfa Antibiotics Hives    Zithromax [Azithromycin] Hives     No current outpatient medications on file. No current facility-administered medications for this visit. Past Medical History:   Diagnosis Date    Abscess, intra-abdominal, postoperative 2014    Acute kidney injury ([facility] [address] 2014    Appendicitis with perforation     patient had intra abdominal abscess    Appendicitis, acute 2013    History of appendectomy     History of lung surgery     Weight loss 2019    Patient states he lost weight intentionally. He went on crash diet and lost about 80 pounds. He is maintaining around 150.   Does not want any test.     Past Surgical History:   Procedure Laterality Date    APPENDECTOMY      LAPAROSCOPIC APPENDECTOMY  13    TONSILLECTOMY       Social History     Tobacco Use    Smoking status: Former     Packs/day: 1.00     Years: 13.00     Pack years: 13.00     Types: Cigarettes     Quit date: 2014     Years since quittin.1    Smokeless tobacco: Never   Substance Use Topics    Alcohol use: Yes     Comment: social       LAB REVIEW:  CBC:   Lab Results   Component Value Date/Time    WBC 9.6 2021 09:55 AM    HGB 16.3 2021 09:55 AM    HCT 46.7 2021 09:55 AM     2021 09:55 AM     Lipids:   Lab Results   Component Value Date    HDL 81 (H) 2021    LDLCALC 90 2021     Renal:   Lab Results   Component Value Date/Time    BUN 13 06/16/2021 09:55 AM    CREATININE 1.0 06/16/2021 09:55 AM     06/16/2021 09:55 AM    K 4.1 06/16/2021 09:55 AM    ALT 33 04/22/2019 03:46 PM    AST 19 04/22/2019 03:46 PM    GLUCOSE 93 06/16/2021 09:55 AM     PT/INR:   Lab Results   Component Value Date/Time    INR 1.52 01/13/2014 11:45 AM     A1C:   Lab Results   Component Value Date    LABA1C 5.1 11/18/2019           Tanvir Garcia MD, 3/13/2023 , 3:51 PM

## 2023-04-24 ENCOUNTER — OFFICE VISIT (OUTPATIENT)
Dept: ORTHOPEDIC SURGERY | Age: 43
End: 2023-04-24
Payer: COMMERCIAL

## 2023-04-24 VITALS
RESPIRATION RATE: 16 BRPM | BODY MASS INDEX: 36.32 KG/M2 | OXYGEN SATURATION: 98 % | WEIGHT: 283 LBS | SYSTOLIC BLOOD PRESSURE: 150 MMHG | HEART RATE: 63 BPM | DIASTOLIC BLOOD PRESSURE: 102 MMHG | HEIGHT: 74 IN

## 2023-04-24 DIAGNOSIS — M23.92 INTERNAL DERANGEMENT OF KNEE, LEFT: Primary | ICD-10-CM

## 2023-04-24 PROCEDURE — 99203 OFFICE O/P NEW LOW 30 MIN: CPT | Performed by: PHYSICIAN ASSISTANT

## 2023-04-24 PROCEDURE — 3079F DIAST BP 80-89 MM HG: CPT | Performed by: PHYSICIAN ASSISTANT

## 2023-04-24 PROCEDURE — 3075F SYST BP GE 130 - 139MM HG: CPT | Performed by: PHYSICIAN ASSISTANT

## 2023-04-24 PROCEDURE — G8417 CALC BMI ABV UP PARAM F/U: HCPCS | Performed by: PHYSICIAN ASSISTANT

## 2023-04-24 PROCEDURE — G8427 DOCREV CUR MEDS BY ELIG CLIN: HCPCS | Performed by: PHYSICIAN ASSISTANT

## 2023-04-24 PROCEDURE — 1036F TOBACCO NON-USER: CPT | Performed by: PHYSICIAN ASSISTANT

## 2023-04-24 NOTE — PROGRESS NOTES
Review of Systems   Constitutional: Negative. HENT: Negative. Eyes: Negative. Respiratory: Negative. Cardiovascular: Negative. Gastrointestinal: Negative. Genitourinary: Negative. Musculoskeletal:  Positive for arthralgias and myalgias. Skin: Negative. Neurological: Negative. Psychiatric/Behavioral: Negative. HPI:  Bruno Osgood is a 38 y.o. male that comes in today to discuss his left knee which she states he injured over a year ago. He states that the left knee gives out on him at times and feels like it it might lock as well. Is able to work however he states he has to be careful how he turns at times. Pain is over the lateral aspect of the knee. 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 He states that this is possible at work and he was told to get a note for that if needed. He does not want an MRI for the knee at this time. Past Medical History:   Diagnosis Date    Abscess, intra-abdominal, postoperative 1/13/2014    Acute kidney injury ([facility] [address] 01/2014    Appendicitis with perforation     patient had intra abdominal abscess    Appendicitis, acute 11/14/2013    History of appendectomy     History of lung surgery     Weight loss 11/18/2019    Patient states he lost weight intentionally. He went on crash diet and lost about 80 pounds. He is maintaining around 150.   Does not want any test.       Past Surgical History:   Procedure Laterality Date    APPENDECTOMY      LAPAROSCOPIC APPENDECTOMY  11/14/13    TONSILLECTOMY  1990       Family History   Problem Relation Age of Onset    Heart Disease Father     Breast Cancer Sister         in remission    Lupus Mother        Social History     Socioeconomic History    Marital status:      Spouse name: None    Number of children: 1    Years of education: 12    Highest education level: None   Occupational History     Employer:
No

## 2023-04-24 NOTE — PATIENT INSTRUCTIONS
Brace given in office today  Work Letter: Patient would benefit from a  due to internal derangement of the left knee  Continue to weight bear as tolerated  Continue range of motion  Ice and elevate as needed  Tylenol or Motrin for pain  Follow up as needed    We are committed to providing you the best care possible. If you receive a survey after visiting one of our offices, please take time to share your experience concerning your physician office visit. These surveys are confidential and no health information about you is shared. We are eager to improve for you and we are counting on your feedback to help make that happen.

## 2023-04-25 ENCOUNTER — OFFICE VISIT (OUTPATIENT)
Dept: INTERNAL MEDICINE CLINIC | Age: 43
End: 2023-04-25
Payer: COMMERCIAL

## 2023-04-25 VITALS
SYSTOLIC BLOOD PRESSURE: 138 MMHG | RESPIRATION RATE: 20 BRPM | TEMPERATURE: 97.3 F | BODY MASS INDEX: 36.67 KG/M2 | HEART RATE: 80 BPM | DIASTOLIC BLOOD PRESSURE: 88 MMHG | OXYGEN SATURATION: 98 % | WEIGHT: 285.6 LBS

## 2023-04-25 DIAGNOSIS — I10 ESSENTIAL HYPERTENSION: Primary | ICD-10-CM

## 2023-04-25 DIAGNOSIS — N52.9 ERECTILE DYSFUNCTION, UNSPECIFIED ERECTILE DYSFUNCTION TYPE: ICD-10-CM

## 2023-04-25 DIAGNOSIS — S83.422S SPRAIN OF LATERAL COLLATERAL LIGAMENT OF LEFT KNEE, SEQUELA: ICD-10-CM

## 2023-04-25 DIAGNOSIS — R07.9 CHEST PAIN AT REST: ICD-10-CM

## 2023-04-25 PROCEDURE — 99213 OFFICE O/P EST LOW 20 MIN: CPT | Performed by: INTERNAL MEDICINE

## 2023-04-25 PROCEDURE — 3075F SYST BP GE 130 - 139MM HG: CPT | Performed by: INTERNAL MEDICINE

## 2023-04-25 PROCEDURE — 93000 ELECTROCARDIOGRAM COMPLETE: CPT | Performed by: INTERNAL MEDICINE

## 2023-04-25 PROCEDURE — 1036F TOBACCO NON-USER: CPT | Performed by: INTERNAL MEDICINE

## 2023-04-25 PROCEDURE — 3079F DIAST BP 80-89 MM HG: CPT | Performed by: INTERNAL MEDICINE

## 2023-04-25 PROCEDURE — G8417 CALC BMI ABV UP PARAM F/U: HCPCS | Performed by: INTERNAL MEDICINE

## 2023-04-25 PROCEDURE — G8427 DOCREV CUR MEDS BY ELIG CLIN: HCPCS | Performed by: INTERNAL MEDICINE

## 2023-04-25 RX ORDER — AMLODIPINE BESYLATE 5 MG/1
5 TABLET ORAL DAILY
Qty: 90 TABLET | Refills: 1 | Status: SHIPPED | OUTPATIENT
Start: 2023-04-25

## 2023-04-25 RX ORDER — SILDENAFIL 50 MG/1
50 TABLET, FILM COATED ORAL PRN
Qty: 5 TABLET | Refills: 3 | Status: SHIPPED | OUTPATIENT
Start: 2023-04-25

## 2023-04-25 ASSESSMENT — ENCOUNTER SYMPTOMS
EYES NEGATIVE: 1
RESPIRATORY NEGATIVE: 1
GASTROINTESTINAL NEGATIVE: 1

## 2023-04-25 ASSESSMENT — PATIENT HEALTH QUESTIONNAIRE - PHQ9
SUM OF ALL RESPONSES TO PHQ QUESTIONS 1-9: 0
2. FEELING DOWN, DEPRESSED OR HOPELESS: 0
1. LITTLE INTEREST OR PLEASURE IN DOING THINGS: 0
SUM OF ALL RESPONSES TO PHQ QUESTIONS 1-9: 0
SUM OF ALL RESPONSES TO PHQ9 QUESTIONS 1 & 2: 0

## 2023-04-25 NOTE — PROGRESS NOTES
[facility]  Patient's  is 1980  Seen in office on 2023      SUBJECTIVE:  Mark le 38 y. o.year old male presents today   Chief Complaint   Patient presents with    Follow-up    Hypertension    Medication Refill       Patient is here for follow-up of hypertension also for follow-up on the left knee pain. 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 He does not want anything more done for the knee until it bothers him more. Recommended MRI but he wants to wait  His blood pressure was elevated at the Ortho's office but it has come down since then. Patient has hypertension. Taking medications No headaches, no chest pain, no palpitations and no dizziness. Complains of ED for the last year or so. He wants to try Viagra. Patient denies any chest pain. No shortness of breath no wheezing. No history of coronary artery disease. No history of diabetes. Taking medications regularly. No side effects noted. Review of Systems  Review of system normal except as in HPI  OBJECTIVE: /88   Pulse 80   Temp 97.3 °F (36.3 °C) (Temporal)   Resp 20   Wt 285 lb 9.6 oz (129.5 kg)   SpO2 98%   BMI 36.67 kg/m²     Wt Readings from Last 3 Encounters:   23 285 lb 9.6 oz (129.5 kg)   23 283 lb (128.4 kg)   23 291 lb 6.4 oz (132.2 kg)      GENERAL: - Alert, oriented, pleasant, in no apparent distress. HEENT: - Conjunctiva pink, no scleral icterus. ENT clear. NECK: -Supple. No jugular venous distention noted. No masses felt,  CARDIOVASCULAR: - Normal S1 and S2    PULMONARY: - No respiratory distress. No wheezes or rales. ABDOMEN: - Soft and non-tender,no masses  ororganomegaly. EXTREMITIES: - No cyanosis, clubbing, or significant edema. SKIN: Skin is warm and dry. NEUROLOGICAL: - Cranial nerves II through XII are grossly intact. IMPRESSION:    Encounter Diagnoses   Name Primary?

## 2023-12-15 ENCOUNTER — OFFICE VISIT (OUTPATIENT)
Dept: INTERNAL MEDICINE CLINIC | Age: 43
End: 2023-12-15
Payer: COMMERCIAL

## 2023-12-15 VITALS
OXYGEN SATURATION: 97 % | BODY MASS INDEX: 37.57 KG/M2 | WEIGHT: 292.6 LBS | RESPIRATION RATE: 16 BRPM | TEMPERATURE: 97.1 F | DIASTOLIC BLOOD PRESSURE: 92 MMHG | SYSTOLIC BLOOD PRESSURE: 146 MMHG | HEART RATE: 72 BPM

## 2023-12-15 DIAGNOSIS — I10 ESSENTIAL HYPERTENSION: Primary | ICD-10-CM

## 2023-12-15 DIAGNOSIS — N52.9 ERECTILE DYSFUNCTION, UNSPECIFIED ERECTILE DYSFUNCTION TYPE: ICD-10-CM

## 2023-12-15 LAB
BASOPHILS # BLD: 0 K/UL (ref 0–0.2)
BASOPHILS NFR BLD: 0.3 %
DEPRECATED RDW RBC AUTO: 13.4 % (ref 12.4–15.4)
EOSINOPHIL # BLD: 0.1 K/UL (ref 0–0.6)
EOSINOPHIL NFR BLD: 1.1 %
HCT VFR BLD AUTO: 46.9 % (ref 40.5–52.5)
HGB BLD-MCNC: 16 G/DL (ref 13.5–17.5)
LYMPHOCYTES # BLD: 2.1 K/UL (ref 1–5.1)
LYMPHOCYTES NFR BLD: 28.5 %
MCH RBC QN AUTO: 30.2 PG (ref 26–34)
MCHC RBC AUTO-ENTMCNC: 34 G/DL (ref 31–36)
MCV RBC AUTO: 88.8 FL (ref 80–100)
MONOCYTES # BLD: 0.4 K/UL (ref 0–1.3)
MONOCYTES NFR BLD: 6.1 %
NEUTROPHILS # BLD: 4.7 K/UL (ref 1.7–7.7)
NEUTROPHILS NFR BLD: 64 %
PLATELET # BLD AUTO: 311 K/UL (ref 135–450)
PMV BLD AUTO: 8.4 FL (ref 5–10.5)
RBC # BLD AUTO: 5.28 M/UL (ref 4.2–5.9)
WBC # BLD AUTO: 7.3 K/UL (ref 4–11)

## 2023-12-15 PROCEDURE — G8484 FLU IMMUNIZE NO ADMIN: HCPCS | Performed by: INTERNAL MEDICINE

## 2023-12-15 PROCEDURE — 3080F DIAST BP >= 90 MM HG: CPT | Performed by: INTERNAL MEDICINE

## 2023-12-15 PROCEDURE — G8427 DOCREV CUR MEDS BY ELIG CLIN: HCPCS | Performed by: INTERNAL MEDICINE

## 2023-12-15 PROCEDURE — 99213 OFFICE O/P EST LOW 20 MIN: CPT | Performed by: INTERNAL MEDICINE

## 2023-12-15 PROCEDURE — 36415 COLL VENOUS BLD VENIPUNCTURE: CPT | Performed by: INTERNAL MEDICINE

## 2023-12-15 PROCEDURE — 1036F TOBACCO NON-USER: CPT | Performed by: INTERNAL MEDICINE

## 2023-12-15 PROCEDURE — G8417 CALC BMI ABV UP PARAM F/U: HCPCS | Performed by: INTERNAL MEDICINE

## 2023-12-15 PROCEDURE — 3077F SYST BP >= 140 MM HG: CPT | Performed by: INTERNAL MEDICINE

## 2023-12-15 RX ORDER — AMLODIPINE BESYLATE 5 MG/1
5 TABLET ORAL DAILY
Qty: 90 TABLET | Refills: 1 | Status: SHIPPED | OUTPATIENT
Start: 2023-12-15

## 2023-12-15 RX ORDER — SILDENAFIL CITRATE 20 MG/1
100 TABLET ORAL DAILY PRN
Qty: 30 TABLET | Refills: 5 | Status: SHIPPED | OUTPATIENT
Start: 2023-12-15

## 2023-12-16 LAB
CHOLEST SERPL-MCNC: 205 MG/DL (ref 0–199)
HDLC SERPL-MCNC: 69 MG/DL (ref 40–60)
LDLC SERPL CALC-MCNC: 125 MG/DL
TRIGL SERPL-MCNC: 56 MG/DL (ref 0–150)
VLDLC SERPL CALC-MCNC: 11 MG/DL

## 2024-04-19 ENCOUNTER — OFFICE VISIT (OUTPATIENT)
Dept: ORTHOPEDIC SURGERY | Age: 44
End: 2024-04-19
Payer: COMMERCIAL

## 2024-04-19 VITALS
WEIGHT: 300 LBS | BODY MASS INDEX: 38.5 KG/M2 | RESPIRATION RATE: 14 BRPM | HEART RATE: 94 BPM | OXYGEN SATURATION: 97 % | HEIGHT: 74 IN

## 2024-04-19 DIAGNOSIS — M23.92 INTERNAL DERANGEMENT OF KNEE, LEFT: Primary | ICD-10-CM

## 2024-04-19 PROCEDURE — 99213 OFFICE O/P EST LOW 20 MIN: CPT | Performed by: PHYSICIAN ASSISTANT

## 2024-04-19 PROCEDURE — G8428 CUR MEDS NOT DOCUMENT: HCPCS | Performed by: PHYSICIAN ASSISTANT

## 2024-04-19 PROCEDURE — 1036F TOBACCO NON-USER: CPT | Performed by: PHYSICIAN ASSISTANT

## 2024-04-19 PROCEDURE — G8417 CALC BMI ABV UP PARAM F/U: HCPCS | Performed by: PHYSICIAN ASSISTANT

## 2024-04-19 ASSESSMENT — ENCOUNTER SYMPTOMS
GASTROINTESTINAL NEGATIVE: 1
RESPIRATORY NEGATIVE: 1
EYES NEGATIVE: 1

## 2024-04-19 NOTE — PROGRESS NOTES
Review of Systems   Constitutional: Negative.    HENT: Negative.     Eyes: Negative.    Respiratory: Negative.     Cardiovascular: Negative.    Gastrointestinal: Negative.    Genitourinary: Negative.    Musculoskeletal:  Positive for arthralgias and myalgias.   Skin: Negative.    Neurological: Negative.    Psychiatric/Behavioral: Negative.           Previous HPI:  Jorje Marks is a 43 y.o. male that comes in today to discuss his left knee which she states he injured over a year ago.  He states that the left knee gives out on him at times and feels like it it might lock as well.  Is able to work however he states he has to be careful how he turns at times.  Pain is over the lateral aspect of the knee.  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  He states that this is possible at work and he was told to get a note for that if needed.  He does not want an MRI for the knee at this time.    Current HPI: Jorje Marks is a 43-year-old male that returns the office today to have his left knee reevaluated.  He continues to have episodes where the knee feels like it slips out of place or hyperextends.  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  He would like to continue having the helper if possible.      Past Medical History:   Diagnosis Date    Abscess, intra-abdominal, postoperative 1/13/2014    Acute kidney injury ([facility]) 01/2014    Appendicitis with perforation     patient had intra abdominal abscess    Appendicitis, acute 11/14/2013    History of appendectomy     History of lung surgery     Weight loss 11/18/2019    Patient states he lost weight intentionally.  He went on crash diet and lost about 80 pounds.  He is maintaining around 150.  Does not want any test.       Past Surgical History:   Procedure Laterality Date    APPENDECTOMY

## 2024-04-19 NOTE — PROGRESS NOTES
Patient is here today for a follow up for his left knee pain. He states that since he was seen he has had accommodation at work he has had a  and has been able to complete his job more effectively. Pain level 3/10 and reports he has intermittent lateral swelling.

## 2024-04-19 NOTE — PATIENT INSTRUCTIONS
Continue to weight-bear as tolerated  Follow-up as needed  If knee continues to be an issue and you need further accommodations then I would have to get an MRI of the knee first.      We are committed to providing you the best care possible.     If you receive a survey after visiting one of our offices, please take time to share your experience concerning your physician office visit.  These surveys are confidential and no health information about you is shared.     We are eager to improve for you and we are counting on your feedback to help make that happen.

## 2024-06-17 ENCOUNTER — OFFICE VISIT (OUTPATIENT)
Age: 44
End: 2024-06-17
Payer: COMMERCIAL

## 2024-06-17 VITALS
TEMPERATURE: 97.6 F | BODY MASS INDEX: 38.13 KG/M2 | SYSTOLIC BLOOD PRESSURE: 456 MMHG | WEIGHT: 297 LBS | RESPIRATION RATE: 18 BRPM | HEART RATE: 84 BPM | DIASTOLIC BLOOD PRESSURE: 102 MMHG | OXYGEN SATURATION: 94 %

## 2024-06-17 DIAGNOSIS — I10 ESSENTIAL HYPERTENSION: Primary | ICD-10-CM

## 2024-06-17 DIAGNOSIS — N52.9 ERECTILE DYSFUNCTION, UNSPECIFIED ERECTILE DYSFUNCTION TYPE: ICD-10-CM

## 2024-06-17 DIAGNOSIS — R63.4 WEIGHT LOSS: ICD-10-CM

## 2024-06-17 PROCEDURE — G8417 CALC BMI ABV UP PARAM F/U: HCPCS | Performed by: INTERNAL MEDICINE

## 2024-06-17 PROCEDURE — 3077F SYST BP >= 140 MM HG: CPT | Performed by: INTERNAL MEDICINE

## 2024-06-17 PROCEDURE — 3079F DIAST BP 80-89 MM HG: CPT | Performed by: INTERNAL MEDICINE

## 2024-06-17 PROCEDURE — 99213 OFFICE O/P EST LOW 20 MIN: CPT | Performed by: INTERNAL MEDICINE

## 2024-06-17 PROCEDURE — 1036F TOBACCO NON-USER: CPT | Performed by: INTERNAL MEDICINE

## 2024-06-17 PROCEDURE — G8427 DOCREV CUR MEDS BY ELIG CLIN: HCPCS | Performed by: INTERNAL MEDICINE

## 2024-06-17 RX ORDER — SILDENAFIL CITRATE 20 MG/1
100 TABLET ORAL DAILY PRN
Qty: 30 TABLET | Refills: 3 | Status: SHIPPED | OUTPATIENT
Start: 2024-06-17

## 2024-06-17 RX ORDER — AMLODIPINE BESYLATE 10 MG/1
10 TABLET ORAL DAILY
Qty: 90 TABLET | Refills: 1 | Status: SHIPPED | OUTPATIENT
Start: 2024-06-17

## 2024-06-17 ASSESSMENT — ENCOUNTER SYMPTOMS
ALLERGIC/IMMUNOLOGIC NEGATIVE: 1
EYES NEGATIVE: 1
RESPIRATORY NEGATIVE: 1

## 2024-06-17 ASSESSMENT — PATIENT HEALTH QUESTIONNAIRE - PHQ9
SUM OF ALL RESPONSES TO PHQ QUESTIONS 1-9: 0
SUM OF ALL RESPONSES TO PHQ9 QUESTIONS 1 & 2: 0
2. FEELING DOWN, DEPRESSED OR HOPELESS: NOT AT ALL
1. LITTLE INTEREST OR PLEASURE IN DOING THINGS: NOT AT ALL
SUM OF ALL RESPONSES TO PHQ QUESTIONS 1-9: 0

## 2024-06-17 NOTE — PROGRESS NOTES
Jorje Marks  Patient's  is 1980  Seen in office on 2024      SUBJECTIVE:  Jorje le 44 y.o.year old male presents today   Chief Complaint   Patient presents with    6 Month Follow-Up    Hypertension    Medication Refill     Patient is here for follow-up of hypertension and ED  Patient states he has not taken amlodipine for the last couple of days.  He has medication but forgot to take it.  He denies any headaches, no dizziness.  No chest pain.  No pedal edema  He has ED and takes sildenafil 100 mg as needed.  No side effects of the medication    Patient states otherwise he is feeling good and has no complaints.    Review of Systems   Constitutional: Negative.    HENT: Negative.     Eyes: Negative.    Respiratory: Negative.     Cardiovascular: Negative.    Endocrine: Negative.    Genitourinary: Negative.    Musculoskeletal: Negative.    Skin: Negative.    Allergic/Immunologic: Negative.    Neurological: Negative.    Hematological: Negative.    Psychiatric/Behavioral: Negative.         OBJECTIVE: BP (!) 456/102 (Site: Left Upper Arm, Position: Sitting, Cuff Size: Large Adult)   Pulse 84   Temp 97.6 °F (36.4 °C) (Oral)   Resp 18   Wt 134.7 kg (297 lb)   SpO2 94%   BMI 38.13 kg/m²     Wt Readings from Last 3 Encounters:   24 134.7 kg (297 lb)   24 136.1 kg (300 lb)   12/15/23 132.7 kg (292 lb 9.6 oz)      GENERAL: - Alert, oriented, pleasant, in no apparent distress.    HEENT: - Conjunctiva pink, no scleral icterus. ENT clear.  NECK: -Supple.  No jugular venous distention noted. No masses felt,  CARDIOVASCULAR: - Normal S1 and S2    PULMONARY: - No respiratory distress.  No wheezes or rales.    ABDOMEN: - Soft and non-tender,no masses  ororganomegaly.  EXTREMITIES: - No cyanosis, clubbing, or significant edema.  SKIN: Skin is warm and dry.   NEUROLOGICAL: - Cranial nerves II through XII are grossly intact.      IMPRESSION:    Encounter Diagnoses   Name Primary?    Essential

## 2024-09-16 ENCOUNTER — OFFICE VISIT (OUTPATIENT)
Age: 44
End: 2024-09-16
Payer: COMMERCIAL

## 2024-09-16 VITALS
DIASTOLIC BLOOD PRESSURE: 88 MMHG | HEART RATE: 88 BPM | TEMPERATURE: 97.8 F | WEIGHT: 295.8 LBS | BODY MASS INDEX: 37.98 KG/M2 | OXYGEN SATURATION: 97 % | RESPIRATION RATE: 16 BRPM | SYSTOLIC BLOOD PRESSURE: 136 MMHG

## 2024-09-16 DIAGNOSIS — R10.10 UPPER ABDOMINAL PAIN: Primary | ICD-10-CM

## 2024-09-16 DIAGNOSIS — I10 ESSENTIAL HYPERTENSION: ICD-10-CM

## 2024-09-16 PROCEDURE — 3079F DIAST BP 80-89 MM HG: CPT | Performed by: INTERNAL MEDICINE

## 2024-09-16 PROCEDURE — 3075F SYST BP GE 130 - 139MM HG: CPT | Performed by: INTERNAL MEDICINE

## 2024-09-16 PROCEDURE — 99213 OFFICE O/P EST LOW 20 MIN: CPT | Performed by: INTERNAL MEDICINE

## 2024-09-16 PROCEDURE — G8417 CALC BMI ABV UP PARAM F/U: HCPCS | Performed by: INTERNAL MEDICINE

## 2024-09-16 PROCEDURE — G8427 DOCREV CUR MEDS BY ELIG CLIN: HCPCS | Performed by: INTERNAL MEDICINE

## 2024-09-16 PROCEDURE — 36415 COLL VENOUS BLD VENIPUNCTURE: CPT | Performed by: INTERNAL MEDICINE

## 2024-09-16 PROCEDURE — 1036F TOBACCO NON-USER: CPT | Performed by: INTERNAL MEDICINE

## 2024-09-16 ASSESSMENT — PATIENT HEALTH QUESTIONNAIRE - PHQ9
1. LITTLE INTEREST OR PLEASURE IN DOING THINGS: NOT AT ALL
SUM OF ALL RESPONSES TO PHQ QUESTIONS 1-9: 0
SUM OF ALL RESPONSES TO PHQ9 QUESTIONS 1 & 2: 0
SUM OF ALL RESPONSES TO PHQ QUESTIONS 1-9: 0
2. FEELING DOWN, DEPRESSED OR HOPELESS: NOT AT ALL
SUM OF ALL RESPONSES TO PHQ QUESTIONS 1-9: 0
SUM OF ALL RESPONSES TO PHQ QUESTIONS 1-9: 0

## 2024-09-17 LAB
ALBUMIN SERPL-MCNC: 4.8 G/DL (ref 3.4–5)
ALBUMIN/GLOB SERPL: 1.9 {RATIO} (ref 1.1–2.2)
ALP SERPL-CCNC: 66 U/L (ref 40–129)
ALT SERPL-CCNC: 27 U/L (ref 10–40)
ANION GAP SERPL CALCULATED.3IONS-SCNC: 11 MMOL/L (ref 3–16)
AST SERPL-CCNC: 25 U/L (ref 15–37)
BASOPHILS # BLD: 0.1 K/UL (ref 0–0.2)
BASOPHILS NFR BLD: 0.9 %
BILIRUB SERPL-MCNC: 0.8 MG/DL (ref 0–1)
BUN SERPL-MCNC: 25 MG/DL (ref 7–20)
CALCIUM SERPL-MCNC: 9.9 MG/DL (ref 8.3–10.6)
CHLORIDE SERPL-SCNC: 104 MMOL/L (ref 99–110)
CO2 SERPL-SCNC: 25 MMOL/L (ref 21–32)
CREAT SERPL-MCNC: 1.1 MG/DL (ref 0.9–1.3)
DEPRECATED RDW RBC AUTO: 14.2 % (ref 12.4–15.4)
EOSINOPHIL # BLD: 0.1 K/UL (ref 0–0.6)
EOSINOPHIL NFR BLD: 1.7 %
GFR SERPLBLD CREATININE-BSD FMLA CKD-EPI: 86 ML/MIN/{1.73_M2}
GLUCOSE SERPL-MCNC: 87 MG/DL (ref 70–99)
HCT VFR BLD AUTO: 45.1 % (ref 40.5–52.5)
HGB BLD-MCNC: 15.7 G/DL (ref 13.5–17.5)
LYMPHOCYTES # BLD: 2 K/UL (ref 1–5.1)
LYMPHOCYTES NFR BLD: 26.9 %
MCH RBC QN AUTO: 31 PG (ref 26–34)
MCHC RBC AUTO-ENTMCNC: 34.7 G/DL (ref 31–36)
MCV RBC AUTO: 89.3 FL (ref 80–100)
MONOCYTES # BLD: 0.8 K/UL (ref 0–1.3)
MONOCYTES NFR BLD: 10.2 %
NEUTROPHILS # BLD: 4.4 K/UL (ref 1.7–7.7)
NEUTROPHILS NFR BLD: 60.3 %
PLATELET # BLD AUTO: 308 K/UL (ref 135–450)
PMV BLD AUTO: 8.8 FL (ref 5–10.5)
POTASSIUM SERPL-SCNC: 4.7 MMOL/L (ref 3.5–5.1)
PROT SERPL-MCNC: 7.3 G/DL (ref 6.4–8.2)
RBC # BLD AUTO: 5.06 M/UL (ref 4.2–5.9)
SODIUM SERPL-SCNC: 140 MMOL/L (ref 136–145)
WBC # BLD AUTO: 7.4 K/UL (ref 4–11)

## 2024-09-26 ENCOUNTER — HOSPITAL ENCOUNTER (OUTPATIENT)
Dept: ULTRASOUND IMAGING | Age: 44
Discharge: HOME | End: 2024-09-26
Payer: COMMERCIAL

## 2024-09-26 DIAGNOSIS — R10.10 UPPER ABDOMINAL PAIN: ICD-10-CM

## 2024-09-26 PROCEDURE — 76705 ECHO EXAM OF ABDOMEN: CPT

## 2024-10-03 PROBLEM — K76.0 HEPATIC STEATOSIS: Status: ACTIVE | Noted: 2024-10-03

## 2024-10-22 ENCOUNTER — OFFICE VISIT (OUTPATIENT)
Age: 44
End: 2024-10-22
Payer: COMMERCIAL

## 2024-10-22 VITALS
WEIGHT: 302 LBS | DIASTOLIC BLOOD PRESSURE: 88 MMHG | BODY MASS INDEX: 38.77 KG/M2 | OXYGEN SATURATION: 95 % | RESPIRATION RATE: 16 BRPM | SYSTOLIC BLOOD PRESSURE: 132 MMHG | HEART RATE: 82 BPM

## 2024-10-22 DIAGNOSIS — K76.0 HEPATIC STEATOSIS: ICD-10-CM

## 2024-10-22 DIAGNOSIS — R10.11 RIGHT UPPER QUADRANT ABDOMINAL PAIN: Primary | ICD-10-CM

## 2024-10-22 DIAGNOSIS — I10 ESSENTIAL HYPERTENSION: ICD-10-CM

## 2024-10-22 PROCEDURE — G8484 FLU IMMUNIZE NO ADMIN: HCPCS | Performed by: INTERNAL MEDICINE

## 2024-10-22 PROCEDURE — 3079F DIAST BP 80-89 MM HG: CPT | Performed by: INTERNAL MEDICINE

## 2024-10-22 PROCEDURE — G8417 CALC BMI ABV UP PARAM F/U: HCPCS | Performed by: INTERNAL MEDICINE

## 2024-10-22 PROCEDURE — 99213 OFFICE O/P EST LOW 20 MIN: CPT | Performed by: INTERNAL MEDICINE

## 2024-10-22 PROCEDURE — G8427 DOCREV CUR MEDS BY ELIG CLIN: HCPCS | Performed by: INTERNAL MEDICINE

## 2024-10-22 PROCEDURE — 3075F SYST BP GE 130 - 139MM HG: CPT | Performed by: INTERNAL MEDICINE

## 2024-10-22 PROCEDURE — 1036F TOBACCO NON-USER: CPT | Performed by: INTERNAL MEDICINE

## 2024-10-30 NOTE — PROGRESS NOTES
Jorje Marks  Patient's  is 1980  Seen in office on 10/22/2024      SUBJECTIVE:  Jorje le 44 y.o.year old male presents today   Chief Complaint   Patient presents with    1 Month Follow-Up    Results     US review     Patient has right upper quadrant abdominal pain for 4 months  Ultrasound of the right upper quadrant done.  Gallbladder was normal.  There was hepatomegaly and fatty liver  CBC and CMP were normal  Patient still has pain in the right upper quadrant  Taking medications regularly. No side effects noted.    Review of Systems    OBJECTIVE: /88   Pulse 82   Resp 16   Wt (!) 137 kg (302 lb)   SpO2 95%   BMI 38.77 kg/m²     Wt Readings from Last 3 Encounters:   10/22/24 (!) 137 kg (302 lb)   24 134.2 kg (295 lb 12.8 oz)   24 134.7 kg (297 lb)      GENERAL: - Alert, oriented, pleasant, in no apparent distress.    HEENT: - Conjunctiva pink, no scleral icterus. ENT clear.  NECK: -Supple.  No jugular venous distention noted. No masses felt,  CARDIOVASCULAR: - Normal S1 and S2    PULMONARY: - No respiratory distress.  No wheezes or rales.    ABDOMEN: - Soft and non-tender,no masses  ororganomegaly.  Mild tenderness in the right upper quadrant  EXTREMITIES: - No cyanosis, clubbing, or significant edema.  SKIN: Skin is warm and dry.   NEUROLOGICAL: - Cranial nerves II through XII are grossly intact.      Ultrasound of the right upper quadrant:    US ABDOMEN LIMITED        INDICATION: Upper abdominal pain.     COMPARISON: None.           FINDINGS: Evaluation is limited due to overlying bowel gas.     Liver: Size 20.4 cm increased echotexture compatible with diffuse  hepatosteatosis.  Common bile duct: 4.6 mm.  Gallbladder: There are no stones or sludge within the gallbladder. No wall  thickening or pericholecystic fluid is identified.  The pancreas was suboptimally visualized.  Scott's sign: Negative     Right Kidney: 11.5 cm. No hydronephrosis.     No ascites.

## 2024-12-30 DIAGNOSIS — N52.9 ERECTILE DYSFUNCTION, UNSPECIFIED ERECTILE DYSFUNCTION TYPE: Primary | ICD-10-CM

## 2024-12-31 RX ORDER — SILDENAFIL CITRATE 20 MG/1
100 TABLET ORAL DAILY PRN
Qty: 30 TABLET | Refills: 3 | Status: SHIPPED | OUTPATIENT
Start: 2024-12-31

## 2025-02-05 ENCOUNTER — OFFICE VISIT (OUTPATIENT)
Age: 45
End: 2025-02-05

## 2025-02-05 VITALS
HEART RATE: 88 BPM | DIASTOLIC BLOOD PRESSURE: 96 MMHG | TEMPERATURE: 97.8 F | WEIGHT: 296.1 LBS | RESPIRATION RATE: 16 BRPM | SYSTOLIC BLOOD PRESSURE: 138 MMHG | BODY MASS INDEX: 38.02 KG/M2 | OXYGEN SATURATION: 99 %

## 2025-02-05 DIAGNOSIS — I10 ESSENTIAL HYPERTENSION: Primary | ICD-10-CM

## 2025-02-05 DIAGNOSIS — N52.8 OTHER MALE ERECTILE DYSFUNCTION: ICD-10-CM

## 2025-02-05 DIAGNOSIS — K76.0 HEPATIC STEATOSIS: ICD-10-CM

## 2025-02-05 PROCEDURE — 3080F DIAST BP >= 90 MM HG: CPT | Performed by: INTERNAL MEDICINE

## 2025-02-05 PROCEDURE — 3075F SYST BP GE 130 - 139MM HG: CPT | Performed by: INTERNAL MEDICINE

## 2025-02-05 PROCEDURE — 99213 OFFICE O/P EST LOW 20 MIN: CPT | Performed by: INTERNAL MEDICINE

## 2025-02-05 RX ORDER — AMLODIPINE BESYLATE 10 MG/1
10 TABLET ORAL DAILY
Qty: 90 TABLET | Refills: 1 | Status: SHIPPED | OUTPATIENT
Start: 2025-02-05

## 2025-02-05 RX ORDER — METOPROLOL SUCCINATE 25 MG/1
25 TABLET, EXTENDED RELEASE ORAL DAILY
Qty: 90 TABLET | Refills: 1 | Status: SHIPPED | OUTPATIENT
Start: 2025-02-05

## 2025-02-05 SDOH — ECONOMIC STABILITY: FOOD INSECURITY: WITHIN THE PAST 12 MONTHS, THE FOOD YOU BOUGHT JUST DIDN'T LAST AND YOU DIDN'T HAVE MONEY TO GET MORE.: NEVER TRUE

## 2025-02-05 SDOH — ECONOMIC STABILITY: FOOD INSECURITY: WITHIN THE PAST 12 MONTHS, YOU WORRIED THAT YOUR FOOD WOULD RUN OUT BEFORE YOU GOT MONEY TO BUY MORE.: NEVER TRUE

## 2025-02-05 NOTE — PROGRESS NOTES
Jorje Marks  Patient's  is 1980  Seen in office on 2025      SUBJECTIVE:  Jorje le 44 y.o.year old male presents today   Chief Complaint   Patient presents with    Hypertension    Medication Refill     History of Present Illness  The patient is a 44-year-old male who presents for a follow-up on his high blood pressure, weight management, abdominal pain, fatty liver, and erectile dysfunction.    He reports persistent elevated blood pressure despite daily administration of amlodipine 10 mg, with the most recent dose taken this morning. He does not monitor his blood pressure at home due to the lack of a personal sphygmomanometer.    He has experienced a weight loss of 6 pounds since 10/22/2024 and acknowledges the need for further weight reduction to better manage his hypertension.    He reports no current abdominal discomfort, attributing this to his daily intake of Pepcid. He was previously advised to undergo a CT scan of the abdomen, but this was not performed due to insurance coverage issues. He has recently lost his job and is currently uninsured.    He has been diagnosed with fatty liver disease.    He continues to use Viagra on an as-needed basis.    MEDICATIONS  Amlodipine, Pepcid, Viagra.      Taking medications regularly. No side effects noted.    Review of Systems  Review of system is normal except as in HPI    OBJECTIVE: BP (!) 138/96 (Site: Right Upper Arm, Position: Sitting, Cuff Size: Large Adult)   Pulse 88   Temp 97.8 °F (36.6 °C) (Oral)   Resp 16   Wt 134.3 kg (296 lb 1.6 oz)   SpO2 99%   BMI 38.02 kg/m²     Wt Readings from Last 3 Encounters:   25 134.3 kg (296 lb 1.6 oz)   10/22/24 (!) 137 kg (302 lb)   24 134.2 kg (295 lb 12.8 oz)      GENERAL: - Alert, oriented, pleasant, in no apparent distress.    HEENT: - Conjunctiva pink, no scleral icterus. ENT clear.  NECK: -Supple.  No jugular venous distention noted. No masses felt,  CARDIOVASCULAR: - Normal S1 and S2

## 2025-05-05 DIAGNOSIS — N52.9 ERECTILE DYSFUNCTION, UNSPECIFIED ERECTILE DYSFUNCTION TYPE: ICD-10-CM

## 2025-05-05 RX ORDER — SILDENAFIL CITRATE 20 MG/1
100 TABLET ORAL DAILY PRN
Qty: 30 TABLET | Refills: 3 | Status: SHIPPED | OUTPATIENT
Start: 2025-05-05

## 2025-05-06 DIAGNOSIS — N52.9 ERECTILE DYSFUNCTION, UNSPECIFIED ERECTILE DYSFUNCTION TYPE: ICD-10-CM

## 2025-05-06 RX ORDER — SILDENAFIL CITRATE 20 MG/1
TABLET ORAL
Qty: 30 TABLET | Refills: 0 | OUTPATIENT
Start: 2025-05-06

## 2025-06-05 ENCOUNTER — OFFICE VISIT (OUTPATIENT)
Age: 45
End: 2025-06-05

## 2025-06-05 VITALS
HEART RATE: 80 BPM | BODY MASS INDEX: 36.8 KG/M2 | DIASTOLIC BLOOD PRESSURE: 100 MMHG | TEMPERATURE: 98.4 F | WEIGHT: 286.6 LBS | RESPIRATION RATE: 16 BRPM | OXYGEN SATURATION: 98 % | SYSTOLIC BLOOD PRESSURE: 128 MMHG

## 2025-06-05 DIAGNOSIS — N52.01 ERECTILE DYSFUNCTION DUE TO ARTERIAL INSUFFICIENCY: ICD-10-CM

## 2025-06-05 DIAGNOSIS — R63.4 WEIGHT LOSS: ICD-10-CM

## 2025-06-05 DIAGNOSIS — K76.0 HEPATIC STEATOSIS: ICD-10-CM

## 2025-06-05 DIAGNOSIS — I10 ESSENTIAL HYPERTENSION: Primary | ICD-10-CM

## 2025-06-05 PROBLEM — S83.92XA LEFT KNEE SPRAIN: Status: RESOLVED | Noted: 2021-11-15 | Resolved: 2025-06-05

## 2025-06-05 PROCEDURE — 99213 OFFICE O/P EST LOW 20 MIN: CPT | Performed by: INTERNAL MEDICINE

## 2025-06-05 PROCEDURE — 3074F SYST BP LT 130 MM HG: CPT | Performed by: INTERNAL MEDICINE

## 2025-06-05 PROCEDURE — 36415 COLL VENOUS BLD VENIPUNCTURE: CPT | Performed by: INTERNAL MEDICINE

## 2025-06-05 PROCEDURE — 3080F DIAST BP >= 90 MM HG: CPT | Performed by: INTERNAL MEDICINE

## 2025-06-05 RX ORDER — METOPROLOL SUCCINATE 25 MG/1
25 TABLET, EXTENDED RELEASE ORAL DAILY
Qty: 90 TABLET | Refills: 1 | Status: SHIPPED | OUTPATIENT
Start: 2025-06-05

## 2025-06-05 RX ORDER — AMLODIPINE BESYLATE 10 MG/1
10 TABLET ORAL DAILY
Qty: 90 TABLET | Refills: 1 | Status: SHIPPED | OUTPATIENT
Start: 2025-06-05

## 2025-06-05 RX ORDER — TADALAFIL 20 MG/1
20 TABLET ORAL DAILY PRN
Qty: 10 TABLET | Refills: 5 | Status: SHIPPED | OUTPATIENT
Start: 2025-06-05

## 2025-06-05 NOTE — PROGRESS NOTES
Jorje Marks  Patient's  is 1980  Seen in office on 2025      SUBJECTIVE:  Jorje le 45 y.o.year old male presents today   Chief Complaint   Patient presents with    Hypertension     History of Present Illness  The patient is a 45-year-old male who presents for evaluation of hypertension, erectile dysfunction, and hepatic steatosis.    He has been experiencing elevated blood pressure readings at home, with a systolic reading of 140 and a diastolic reading of 100 during his last visit. His diastolic blood pressure remains high, despite a reduction in his systolic blood pressure by 20 points. He reports no chest pain or shortness of breath. He has been adhering to a dietary regimen aimed at reducing his sugar intake. He has also made significant changes to his diet, including eliminating meat and reducing his overall food intake. His current diet consists of a peanut butter and jelly sandwich once daily, a banana twice daily, and a small bag of popcorn at night. He has been on amlodipine 10 mg daily and metoprolol 25 mg daily but has not taken the latter for approximately one month due to a perceived lack of refills. He has been losing weight, which he believes should be helping his condition. He has lost about 20 pounds since his last visit and continues to follow a diet to maintain low sugar levels. He has been losing weight at an average rate of 4 to 5 pounds per week.    He has hepatic steatosis and an enlarged liver, as confirmed by ultrasound. His last blood test was conducted in 2024, which showed normal liver enzymes. He reports no abdominal pain and believes that his weight loss may have contributed to this improvement. He has a family history of enlarged liver on his mother's side.    He has been taking sildenafil for erectile dysfunction but reports that it is not as effective as it used to be. He is considering switching to Cialis, which he understands can be taken once weekly. He is

## 2025-06-06 LAB
ALBUMIN SERPL-MCNC: 4.8 G/DL (ref 3.4–5)
ALBUMIN/GLOB SERPL: 2 {RATIO} (ref 1.1–2.2)
ALP SERPL-CCNC: 79 U/L (ref 40–129)
ALT SERPL-CCNC: 44 U/L (ref 10–40)
ANION GAP SERPL CALCULATED.3IONS-SCNC: 13 MMOL/L (ref 3–16)
AST SERPL-CCNC: 28 U/L (ref 15–37)
BILIRUB SERPL-MCNC: 1.4 MG/DL (ref 0–1)
BUN SERPL-MCNC: 8 MG/DL (ref 7–20)
CALCIUM SERPL-MCNC: 10.1 MG/DL (ref 8.3–10.6)
CHLORIDE SERPL-SCNC: 101 MMOL/L (ref 99–110)
CO2 SERPL-SCNC: 21 MMOL/L (ref 21–32)
CREAT SERPL-MCNC: 1.1 MG/DL (ref 0.9–1.3)
GFR SERPLBLD CREATININE-BSD FMLA CKD-EPI: 85 ML/MIN/{1.73_M2}
GLUCOSE SERPL-MCNC: 98 MG/DL (ref 70–99)
POTASSIUM SERPL-SCNC: 4.4 MMOL/L (ref 3.5–5.1)
PROT SERPL-MCNC: 7.2 G/DL (ref 6.4–8.2)
SODIUM SERPL-SCNC: 135 MMOL/L (ref 136–145)

## 2025-06-11 ENCOUNTER — RESULTS FOLLOW-UP (OUTPATIENT)
Age: 45
End: 2025-06-11

## 2025-06-25 ENCOUNTER — TELEPHONE (OUTPATIENT)
Age: 45
End: 2025-06-25